# Patient Record
Sex: FEMALE | Race: WHITE | NOT HISPANIC OR LATINO | URBAN - METROPOLITAN AREA
[De-identification: names, ages, dates, MRNs, and addresses within clinical notes are randomized per-mention and may not be internally consistent; named-entity substitution may affect disease eponyms.]

---

## 2017-02-06 ENCOUNTER — OUTPATIENT (OUTPATIENT)
Dept: OUTPATIENT SERVICES | Facility: HOSPITAL | Age: 67
LOS: 1 days | End: 2017-02-06
Payer: MEDICARE

## 2017-02-06 ENCOUNTER — APPOINTMENT (OUTPATIENT)
Dept: MRI IMAGING | Facility: CLINIC | Age: 67
End: 2017-02-06

## 2017-02-06 DIAGNOSIS — Z00.8 ENCOUNTER FOR OTHER GENERAL EXAMINATION: ICD-10-CM

## 2017-02-06 PROCEDURE — A9585: CPT

## 2017-02-06 PROCEDURE — C8908: CPT

## 2017-02-06 PROCEDURE — 82565 ASSAY OF CREATININE: CPT

## 2017-02-06 PROCEDURE — C8937: CPT

## 2017-06-02 ENCOUNTER — APPOINTMENT (OUTPATIENT)
Dept: ENDOCRINOLOGY | Facility: CLINIC | Age: 67
End: 2017-06-02

## 2017-06-02 VITALS
BODY MASS INDEX: 22.68 KG/M2 | DIASTOLIC BLOOD PRESSURE: 74 MMHG | HEART RATE: 59 BPM | HEIGHT: 63 IN | WEIGHT: 128 LBS | OXYGEN SATURATION: 98 % | SYSTOLIC BLOOD PRESSURE: 114 MMHG

## 2017-06-02 RX ORDER — DENOSUMAB 60 MG/ML
60 INJECTION SUBCUTANEOUS
Qty: 1 | Refills: 0 | Status: COMPLETED | OUTPATIENT
Start: 2017-06-02

## 2017-06-02 RX ADMIN — DENOSUMAB 0 MG/ML: 60 INJECTION SUBCUTANEOUS at 00:00

## 2017-11-20 ENCOUNTER — APPOINTMENT (OUTPATIENT)
Dept: SURGERY | Facility: CLINIC | Age: 67
End: 2017-11-20
Payer: MEDICARE

## 2017-11-20 PROCEDURE — 99213K: CUSTOM

## 2017-12-08 ENCOUNTER — APPOINTMENT (OUTPATIENT)
Dept: ENDOCRINOLOGY | Facility: CLINIC | Age: 67
End: 2017-12-08
Payer: MEDICARE

## 2017-12-08 VITALS — OXYGEN SATURATION: 98 % | HEART RATE: 74 BPM | SYSTOLIC BLOOD PRESSURE: 120 MMHG | DIASTOLIC BLOOD PRESSURE: 80 MMHG

## 2017-12-08 PROCEDURE — 99214 OFFICE O/P EST MOD 30 MIN: CPT | Mod: 25

## 2017-12-08 PROCEDURE — 96372 THER/PROPH/DIAG INJ SC/IM: CPT

## 2017-12-08 PROCEDURE — 77080 DXA BONE DENSITY AXIAL: CPT | Mod: GA

## 2017-12-08 PROCEDURE — ZZZZZ: CPT

## 2017-12-08 RX ORDER — DENOSUMAB 60 MG/ML
60 INJECTION SUBCUTANEOUS
Qty: 0 | Refills: 0 | Status: COMPLETED | OUTPATIENT
Start: 2017-12-08

## 2017-12-08 RX ADMIN — DENOSUMAB 0 MG/ML: 60 INJECTION SUBCUTANEOUS at 00:00

## 2017-12-09 LAB
CHOLEST SERPL-MCNC: 247 MG/DL
CHOLEST/HDLC SERPL: 2.7 RATIO
HDLC SERPL-MCNC: 93 MG/DL
LDLC SERPL CALC-MCNC: 138 MG/DL
TRIGL SERPL-MCNC: 82 MG/DL

## 2018-02-27 ENCOUNTER — APPOINTMENT (OUTPATIENT)
Dept: MRI IMAGING | Facility: CLINIC | Age: 68
End: 2018-02-27
Payer: MEDICARE

## 2018-02-27 ENCOUNTER — OUTPATIENT (OUTPATIENT)
Dept: OUTPATIENT SERVICES | Facility: HOSPITAL | Age: 68
LOS: 1 days | End: 2018-02-27
Payer: MEDICARE

## 2018-02-27 DIAGNOSIS — Z00.8 ENCOUNTER FOR OTHER GENERAL EXAMINATION: ICD-10-CM

## 2018-02-27 PROCEDURE — A9585: CPT

## 2018-02-27 PROCEDURE — 0159T: CPT | Mod: 26

## 2018-02-27 PROCEDURE — C8937: CPT

## 2018-02-27 PROCEDURE — C8908: CPT

## 2018-02-27 PROCEDURE — 82565 ASSAY OF CREATININE: CPT

## 2018-02-27 PROCEDURE — 77059 MRI BREAST BILATERAL: CPT | Mod: 26

## 2018-06-15 ENCOUNTER — APPOINTMENT (OUTPATIENT)
Dept: ENDOCRINOLOGY | Facility: CLINIC | Age: 68
End: 2018-06-15
Payer: MEDICARE

## 2018-06-15 ENCOUNTER — TRANSCRIPTION ENCOUNTER (OUTPATIENT)
Age: 68
End: 2018-06-15

## 2018-06-15 VITALS
HEIGHT: 63.5 IN | BODY MASS INDEX: 21.35 KG/M2 | SYSTOLIC BLOOD PRESSURE: 130 MMHG | WEIGHT: 122 LBS | HEART RATE: 73 BPM | DIASTOLIC BLOOD PRESSURE: 80 MMHG | OXYGEN SATURATION: 93 %

## 2018-06-15 DIAGNOSIS — Z00.00 ENCOUNTER FOR GENERAL ADULT MEDICAL EXAMINATION W/OUT ABNORMAL FINDINGS: ICD-10-CM

## 2018-06-15 PROCEDURE — 96372 THER/PROPH/DIAG INJ SC/IM: CPT

## 2018-06-15 PROCEDURE — 99214 OFFICE O/P EST MOD 30 MIN: CPT | Mod: 25

## 2018-06-15 RX ORDER — MECLIZINE HYDROCHLORIDE 25 MG/1
25 TABLET ORAL
Qty: 20 | Refills: 0 | Status: COMPLETED | COMMUNITY
Start: 2018-05-18

## 2018-06-15 RX ORDER — METRONIDAZOLE 500 MG/1
500 TABLET ORAL
Qty: 30 | Refills: 0 | Status: COMPLETED | COMMUNITY
Start: 2018-03-21

## 2018-06-15 RX ORDER — CLOTRIMAZOLE 10 MG/1
10 LOZENGE ORAL
Qty: 21 | Refills: 0 | Status: COMPLETED | COMMUNITY
Start: 2018-04-02

## 2018-06-15 RX ORDER — DICYCLOMINE HYDROCHLORIDE 10 MG/1
10 CAPSULE ORAL
Qty: 30 | Refills: 0 | Status: COMPLETED | COMMUNITY
Start: 2018-04-05

## 2018-06-15 RX ORDER — CIPROFLOXACIN HYDROCHLORIDE 500 MG/1
500 TABLET, FILM COATED ORAL
Qty: 20 | Refills: 0 | Status: COMPLETED | COMMUNITY
Start: 2018-03-21

## 2018-06-15 RX ORDER — DENOSUMAB 60 MG/ML
60 INJECTION SUBCUTANEOUS
Qty: 0 | Refills: 0 | Status: COMPLETED | OUTPATIENT
Start: 2018-06-15

## 2018-06-15 RX ORDER — FLUCONAZOLE 150 MG/1
150 TABLET ORAL
Qty: 7 | Refills: 0 | Status: COMPLETED | COMMUNITY
Start: 2018-04-06

## 2018-06-15 RX ADMIN — DENOSUMAB 0 MG/ML: 60 INJECTION SUBCUTANEOUS at 00:00

## 2018-11-26 ENCOUNTER — APPOINTMENT (OUTPATIENT)
Dept: SURGERY | Facility: CLINIC | Age: 68
End: 2018-11-26
Payer: MEDICARE

## 2018-11-26 PROCEDURE — 99213K: CUSTOM

## 2018-12-21 ENCOUNTER — APPOINTMENT (OUTPATIENT)
Dept: ENDOCRINOLOGY | Facility: CLINIC | Age: 68
End: 2018-12-21
Payer: MEDICARE

## 2018-12-21 VITALS
DIASTOLIC BLOOD PRESSURE: 90 MMHG | HEART RATE: 74 BPM | SYSTOLIC BLOOD PRESSURE: 120 MMHG | OXYGEN SATURATION: 98 % | HEIGHT: 63.5 IN | WEIGHT: 126 LBS | BODY MASS INDEX: 22.05 KG/M2

## 2018-12-21 PROCEDURE — 96372 THER/PROPH/DIAG INJ SC/IM: CPT

## 2018-12-21 PROCEDURE — 99214 OFFICE O/P EST MOD 30 MIN: CPT | Mod: 25

## 2018-12-21 RX ORDER — DENOSUMAB 60 MG/ML
60 INJECTION SUBCUTANEOUS
Qty: 1 | Refills: 0 | Status: COMPLETED | OUTPATIENT
Start: 2018-12-21

## 2018-12-21 RX ORDER — DENOSUMAB 60 MG/ML
60 INJECTION SUBCUTANEOUS
Refills: 0 | Status: ACTIVE | COMMUNITY

## 2018-12-21 RX ADMIN — DENOSUMAB 0 MG/ML: 60 INJECTION SUBCUTANEOUS at 00:00

## 2019-02-07 ENCOUNTER — OUTPATIENT (OUTPATIENT)
Dept: OUTPATIENT SERVICES | Facility: HOSPITAL | Age: 69
LOS: 1 days | End: 2019-02-07
Payer: MEDICARE

## 2019-02-07 ENCOUNTER — APPOINTMENT (OUTPATIENT)
Dept: MRI IMAGING | Facility: CLINIC | Age: 69
End: 2019-02-07
Payer: MEDICARE

## 2019-02-07 DIAGNOSIS — Z00.8 ENCOUNTER FOR OTHER GENERAL EXAMINATION: ICD-10-CM

## 2019-02-07 PROCEDURE — C8908: CPT

## 2019-02-07 PROCEDURE — 77049 MRI BREAST C-+ W/CAD BI: CPT | Mod: 26

## 2019-02-07 PROCEDURE — C8937: CPT

## 2019-02-07 PROCEDURE — A9585: CPT

## 2019-07-05 ENCOUNTER — APPOINTMENT (OUTPATIENT)
Dept: ENDOCRINOLOGY | Facility: CLINIC | Age: 69
End: 2019-07-05
Payer: MEDICARE

## 2019-07-05 VITALS
DIASTOLIC BLOOD PRESSURE: 80 MMHG | OXYGEN SATURATION: 98 % | SYSTOLIC BLOOD PRESSURE: 120 MMHG | BODY MASS INDEX: 21.35 KG/M2 | HEIGHT: 63.5 IN | HEART RATE: 64 BPM | WEIGHT: 122 LBS

## 2019-07-05 PROCEDURE — 99214 OFFICE O/P EST MOD 30 MIN: CPT

## 2019-07-05 NOTE — ASSESSMENT
[Denosumab Therapy] : Risks  and benefits of denosumab therapy were discussed with the patient including eczema, cellulitis, osteonecrosis of the jaw and atypical femur fractures [FreeTextEntry1] : 69-year-old female with\par 1.  osteoporosis. Bone density Nov 2016  showed  progressive decrease in spine and hip. Was on Evista after long-term Fosamax. Changed to Prolia tolerating well but had tooth extraction, "dry socket". \par Repeat bone density significantly improved in spine.\par Delay Prolia for 4 weeks. \par \par 2. clinically and chemically euthyroid after stopping low dose LT4 for suppression of small thyroid nodules; lack of data for this reviewed. thyroid nodules remain small and stable.  TSH 2.91 Observe

## 2019-07-05 NOTE — HISTORY OF PRESENT ILLNESS
[Calcium (dietary)] : calcium from their regular diet [Raloxifene (Evista)] : Raloxifene [Prolia (Denosumab)] : Prolia (Denosumab) [Family History of Breast Cancer] : family history of breast cancer [Family History of Osteoporosis] : family history of osteoporosis [Regular Dental Follow-Up] : regular dental follow-up [FreeTextEntry1] : f/u 69 -year-old female,  osteoporosis first diagnosed with low bone density at age 51. Patient took Fosamax for 8 to 9 years. stopped about 6 years. did not tolerate Actonel in past due to UGI sx's. Switched to Evista few years ago, and tolerated well. Had one dose of  Prolia from Dr. Kaycee benson years ago. \par BMD Nov 2016 decreased Restarted Prolia Tolerated well no fx. \par Had recent wisdom tooth extraction: "dry socket":\par no fracture\par BMD 12/17 improved but still low.\par Ca: 9.5\par \par stopped Evista  despite family history of breast cancer in mother.\par  Had ? swelling in neck, sono normal \par history multinodular goiter. was on low-dose Synthroid 25 mcg daily for suppression of nodules. stopped LT4 2, no change in sx's. Patient unaware of hypothyroidism.TSH 2.17  off rx. No change in neck or sx's.\par \par Recent episodes of diverticulitis, thrush, and vertigo.  [Amenorrhea] : no past or present history of amenorrhea [Disordered Eating] : no past or present history of disordered eating [Taking Steroids] : no past or present history of taking steroids [Kidney Stones] : no history of kidney stones [Family History of Hip Fracture] : no family history of hip fracture [Hyperparathyroidism] : no hyperparathyroidism [History of Radiation Therapy] : no history of radiation therapy [History of Blood Clots] : no history of blood clots [de-identified] : mother breast cancer

## 2019-07-05 NOTE — RESULTS/DATA
[FreeTextEntry1] : bone density June 26, 2013\par Spine L1-L3 -2.9\par Total hip -2.9\par Femoral neck -2.7\par No statistical difference versus 2011

## 2019-07-05 NOTE — PHYSICAL EXAM
[Alert] : alert [No Acute Distress] : no acute distress [Well Nourished] : well nourished [Well Developed] : well developed [Normal Sclera/Conjunctiva] : normal sclera/conjunctiva [No Proptosis] : no proptosis [Normal Oropharynx] : the oropharynx was normal [Thyroid Not Enlarged] : the thyroid was not enlarged [No Thyroid Nodules] : there were no palpable thyroid nodules [No Respiratory Distress] : no respiratory distress [No Accessory Muscle Use] : no accessory muscle use [Clear to Auscultation] : lungs were clear to auscultation bilaterally [Normal Rate] : heart rate was normal  [Normal S1, S2] : normal S1 and S2 [Regular Rhythm] : with a regular rhythm [No Edema] : there was no peripheral edema [Normal Bowel Sounds] : normal bowel sounds [Not Tender] : non-tender [Soft] : abdomen soft [Not Distended] : not distended [Post Cervical Nodes] : posterior cervical nodes [Anterior Cervical Nodes] : anterior cervical nodes [Normal] : normal and non tender [No Spinal Tenderness] : no spinal tenderness [Spine Straight] : spine straight [No Stigmata of Cushings Syndrome] : no stigmata of cushings syndrome [Normal Gait] : normal gait [Normal Strength/Tone] : muscle strength and tone were normal [No Rash] : no rash [No Skin Lesions] : no skin lesions [Normal Reflexes] : deep tendon reflexes were 2+ and symmetric [No Tremors] : no tremors [Oriented x3] : oriented to person, place, and time [de-identified] : right lower molar extraction site: no infection [de-identified] : normal range of motion LE's bilat, normal strength, no localizing hip disease

## 2019-07-05 NOTE — PROCEDURE
[FreeTextEntry1] : Thyroid US - 07/5/19\par Right: Cystic nodule 7 x 6 x 10 mm\par Left:  nodules 8 x 6 x10\par 2nd nodule not identified\par \par Thyroid US - 06/15/2018 \par Right: Cystic nodule 7 x 6 x 8 mm\par Left: two nodules 5 x 7 x 9\par left  6 x 7 x 8 mm nodule\par \par Thyroid US - 06/15/2018 \par Right: Cystic nodule 7 x 6 x 8 mm\par Left: Possibly two nodules 8 x 5 x 10 mm, adjacent 6 mm nodule\par \par Bone mineral density 12/8/17\par indication: assess response to medication \par spine -2.5 excluding L4 osteoporosis +8.7%\par total hip -2.8, osteoporosis +4.2%\par femoral neck -3.4 osteoporosis, no significant change \par proximal radius  -3.0 osteoporosis, no significant change \par \par Thyroid ultrasound  December 8, 2017\par right 8 mm mostly cystic nodule\par Left 6 x 4 x 11 mm nodule\par \par Bone mineral density November 29, 2016\par Comparison to 2015\par Spine L1-L3 -3.0, osteoporosis, no significant change Versus 2015 but -4.1% versus 2014\par Total hip -3.0, osteoporosis, -5.3% \par Femoral neck -3.2, osteoporosis, +4.2% \par Proximal radius -2.8, osteoporosis, no significant \par \par Thyroid ultrasound November 29, 2016 \par Right 4 mm simple cyst \par Left 6 4  x 10 mm cystic nodule \par Second left partially cystic nodule 6 x 5  x 10 mm Nodule\par \par \par bone mineral density November 24, 2015\par Indication prior test showed bone loss\par Spine -2.9, osteoporosis, no significant change versus 2014 or 2013\par Total hip -2.7, osteoporosis, no significant change\par femoral neck -3.4, osteoporosis, no significant change\par  proximal radius -2.9, osteoporosis, no significant change\par \par Thyroid ultrasound November 24, 2015\par 2 small left nodules\par Largest 8 mm sagittal smaller 4 mm sagittal\par \par \par bone mineral density test performed November 19, 2014\par spine -2.7,, osteoporosis, no significant change versus 2013\par total hip -2.7, osteoporosis, no significant change versus outside study of 2013\par Femoral neck -3.3, osteoporosis, appears decreased versus 2013 but this appears to be due to changing equipment then changing region of interest\par Proximal radius -2.6, osteoporosis, no prior study

## 2019-08-16 ENCOUNTER — APPOINTMENT (OUTPATIENT)
Dept: ENDOCRINOLOGY | Facility: CLINIC | Age: 69
End: 2019-08-16
Payer: MEDICARE

## 2019-08-16 VITALS
SYSTOLIC BLOOD PRESSURE: 138 MMHG | DIASTOLIC BLOOD PRESSURE: 88 MMHG | OXYGEN SATURATION: 98 % | HEART RATE: 68 BPM | RESPIRATION RATE: 18 BRPM

## 2019-08-16 PROCEDURE — 96372 THER/PROPH/DIAG INJ SC/IM: CPT

## 2019-08-16 PROCEDURE — 99212 OFFICE O/P EST SF 10 MIN: CPT | Mod: 25

## 2019-08-16 RX ORDER — DENOSUMAB 60 MG/ML
60 INJECTION SUBCUTANEOUS
Qty: 1 | Refills: 0 | Status: COMPLETED | OUTPATIENT
Start: 2019-08-16

## 2019-08-16 RX ADMIN — DENOSUMAB 0 MG/ML: 60 INJECTION SUBCUTANEOUS at 00:00

## 2019-11-18 ENCOUNTER — APPOINTMENT (OUTPATIENT)
Dept: SURGERY | Facility: CLINIC | Age: 69
End: 2019-11-18
Payer: MEDICARE

## 2019-11-18 PROCEDURE — 99213K: CUSTOM

## 2020-02-13 ENCOUNTER — APPOINTMENT (OUTPATIENT)
Dept: MRI IMAGING | Facility: CLINIC | Age: 70
End: 2020-02-13
Payer: MEDICARE

## 2020-02-13 ENCOUNTER — OUTPATIENT (OUTPATIENT)
Dept: OUTPATIENT SERVICES | Facility: HOSPITAL | Age: 70
LOS: 1 days | End: 2020-02-13
Payer: MEDICARE

## 2020-02-13 DIAGNOSIS — Z00.8 ENCOUNTER FOR OTHER GENERAL EXAMINATION: ICD-10-CM

## 2020-02-13 PROCEDURE — C8908: CPT

## 2020-02-13 PROCEDURE — C8937: CPT

## 2020-02-13 PROCEDURE — A9585: CPT

## 2020-02-13 PROCEDURE — 77049 MRI BREAST C-+ W/CAD BI: CPT | Mod: 26

## 2020-02-20 ENCOUNTER — APPOINTMENT (OUTPATIENT)
Dept: ENDOCRINOLOGY | Facility: CLINIC | Age: 70
End: 2020-02-20
Payer: MEDICARE

## 2020-02-20 VITALS — BODY MASS INDEX: 21.35 KG/M2 | HEIGHT: 63.5 IN | WEIGHT: 122 LBS

## 2020-02-20 VITALS
BODY MASS INDEX: 21.35 KG/M2 | DIASTOLIC BLOOD PRESSURE: 80 MMHG | SYSTOLIC BLOOD PRESSURE: 120 MMHG | WEIGHT: 122 LBS | OXYGEN SATURATION: 95 % | HEIGHT: 63.5 IN | HEART RATE: 82 BPM

## 2020-02-20 PROCEDURE — 99213 OFFICE O/P EST LOW 20 MIN: CPT | Mod: 25

## 2020-02-20 PROCEDURE — ZZZZZ: CPT

## 2020-02-20 PROCEDURE — 77080 DXA BONE DENSITY AXIAL: CPT | Mod: GA

## 2020-02-20 PROCEDURE — 96372 THER/PROPH/DIAG INJ SC/IM: CPT

## 2020-02-20 RX ORDER — CHROMIUM 200 MCG
TABLET ORAL
Refills: 0 | Status: ACTIVE | COMMUNITY

## 2020-02-20 RX ORDER — DENOSUMAB 60 MG/ML
60 INJECTION SUBCUTANEOUS
Qty: 1 | Refills: 0 | Status: COMPLETED | OUTPATIENT
Start: 2020-02-20

## 2020-02-20 RX ADMIN — DENOSUMAB 60 MG/ML: 60 INJECTION SUBCUTANEOUS at 00:00

## 2020-02-21 NOTE — HISTORY OF PRESENT ILLNESS
[Calcium (dietary)] : calcium from their regular diet [Prolia (Denosumab)] : Prolia (Denosumab) [Raloxifene (Evista)] : Raloxifene [Family History of Osteoporosis] : family history of osteoporosis [Family History of Breast Cancer] : family history of breast cancer [Regular Dental Follow-Up] : regular dental follow-up [FreeTextEntry1] : f/u 69 -year-old female w/ osteoporosis \par \par First diagnosed with low bone density at age 51. Patient took Fosamax for 8 to 9 years. stopped about 6 years. Did not tolerate Actonel in past due to UGI sx's. Switched to Evista few years ago, and tolerated well now d/c. Had one dose of Prolia from Dr. Benoit several years ago. BMD Nov 2016 decreased. Transitioned to Prolia 11/2016. Tolerating well. No thigh pain, no interval fx. Last DDS within past 6 months. No ONJ. Had prior wisdom tooth extraction. BMD 12/2017 improved but still low.\par \par History multinodular goiter. was on low-dose Synthroid 25 mcg daily for suppression of nodules. Stopped LT4 25, no change in sx's. Patient unaware of hypothyroidism. No change in neck or sx's. No local neck pain. No dysphagia or dysphonia. No raciness, shakiness, heat/cold intolerance, tiredness, or fatigue. No palpitations, tremors, or sudden weight gain/loss.\par \par Prior episodes of diverticulitis, thrush, and vertigo.\par \par Pt broke nose en route to shingles shot in the pharmacy. [Disordered Eating] : no past or present history of disordered eating [Amenorrhea] : no past or present history of amenorrhea [Taking Steroids] : no past or present history of taking steroids [Kidney Stones] : no history of kidney stones [Family History of Hip Fracture] : no family history of hip fracture [Hyperparathyroidism] : no hyperparathyroidism [History of Radiation Therapy] : no history of radiation therapy [History of Blood Clots] : no history of blood clots [de-identified] : mother breast cancer

## 2020-02-21 NOTE — PROCEDURE
[FreeTextEntry1] : Thyroid US - 02/20/2020\par Left: solid nodule 6 x 6 x 9 mm\par Right: cyst 7 x 6 x 9 mm\par \par Bone mineral density: 02/20/2020 \par Indication: vs. 2017\par Spine: (L1-L3) -2.1 osteopenia, +5.0%, approx. +14% since 2016\par Total hip: -2.7 osteoporosis, no significant change, approx +6% vs. 2016\par Femoral neck: -3.0 osteoporosis, +8.0%\par Proximal radius: -2.8 osteoporosis, no significant change\par \par Thyroid US - 07/5/19\par Right: Cystic nodule 7 x 6 x 10 mm\par Left:  nodules 8 x 6 x10\par 2nd nodule not identified\par \par Thyroid US - 06/15/2018 \par Right: Cystic nodule 7 x 6 x 8 mm\par Left: two nodules 5 x 7 x 9\par left  6 x 7 x 8 mm nodule\par \par Thyroid US - 06/15/2018 \par Right: Cystic nodule 7 x 6 x 8 mm\par Left: Possibly two nodules 8 x 5 x 10 mm, adjacent 6 mm nodule\par \par Bone mineral density 12/8/17\par indication: assess response to medication \par spine -2.5 excluding L4 osteoporosis +8.7%\par total hip -2.8, osteoporosis +4.2%\par femoral neck -3.4 osteoporosis, no significant change \par proximal radius  -3.0 osteoporosis, no significant change \par \par Thyroid ultrasound  December 8, 2017\par right 8 mm mostly cystic nodule\par Left 6 x 4 x 11 mm nodule\par \par Bone mineral density November 29, 2016\par Comparison to 2015\par Spine L1-L3 -3.0, osteoporosis, no significant change Versus 2015 but -4.1% versus 2014\par Total hip -3.0, osteoporosis, -5.3% \par Femoral neck -3.2, osteoporosis, +4.2% \par Proximal radius -2.8, osteoporosis, no significant \par \par Thyroid ultrasound November 29, 2016 \par Right 4 mm simple cyst \par Left 6 4  x 10 mm cystic nodule \par Second left partially cystic nodule 6 x 5  x 10 mm Nodule\par \par \par bone mineral density November 24, 2015\par Indication prior test showed bone loss\par Spine -2.9, osteoporosis, no significant change versus 2014 or 2013\par Total hip -2.7, osteoporosis, no significant change\par femoral neck -3.4, osteoporosis, no significant change\par  proximal radius -2.9, osteoporosis, no significant change\par \par Thyroid ultrasound November 24, 2015\par 2 small left nodules\par Largest 8 mm sagittal smaller 4 mm sagittal\par \par \par bone mineral density test performed November 19, 2014\par spine -2.7,, osteoporosis, no significant change versus 2013\par total hip -2.7, osteoporosis, no significant change versus outside study of 2013\par Femoral neck -3.3, osteoporosis, appears decreased versus 2013 but this appears to be due to changing equipment then changing region of interest\par Proximal radius -2.6, osteoporosis, no prior study

## 2020-02-21 NOTE — PHYSICAL EXAM
[No Acute Distress] : no acute distress [Alert] : alert [Well Nourished] : well nourished [No Proptosis] : no proptosis [Normal Oropharynx] : the oropharynx was normal [Well Developed] : well developed [Normal Sclera/Conjunctiva] : normal sclera/conjunctiva [No Thyroid Nodules] : there were no palpable thyroid nodules [Thyroid Not Enlarged] : the thyroid was not enlarged [No Accessory Muscle Use] : no accessory muscle use [No Respiratory Distress] : no respiratory distress [Clear to Auscultation] : lungs were clear to auscultation bilaterally [Normal S1, S2] : normal S1 and S2 [Normal Rate] : heart rate was normal  [Normal Bowel Sounds] : normal bowel sounds [Regular Rhythm] : with a regular rhythm [No Edema] : there was no peripheral edema [Not Tender] : non-tender [Soft] : abdomen soft [Not Distended] : not distended [Anterior Cervical Nodes] : anterior cervical nodes [Normal] : normal and non tender [Post Cervical Nodes] : posterior cervical nodes [No Stigmata of Cushings Syndrome] : no stigmata of cushings syndrome [No Spinal Tenderness] : no spinal tenderness [Spine Straight] : spine straight [Normal Gait] : normal gait [Normal Strength/Tone] : muscle strength and tone were normal [No Rash] : no rash [No Skin Lesions] : no skin lesions [No Tremors] : no tremors [Normal Reflexes] : deep tendon reflexes were 2+ and symmetric [Oriented x3] : oriented to person, place, and time [de-identified] : right lower molar extraction site: no infection [de-identified] : normal range of motion LE's bilat, normal strength, no localizing hip disease

## 2020-02-21 NOTE — ASSESSMENT
[Denosumab Therapy] : Risks  and benefits of denosumab therapy were discussed with the patient including eczema, cellulitis, osteonecrosis of the jaw and atypical femur fractures [FreeTextEntry1] : 69 year-old female with\par \par 1. Osteoporosis. BMD 2016 showed progressive decrease in spine and hip. Was on Evista after long-term Fosamax. Transitioned to Prolia 11/2016. Tolerating well. No thigh pain, no interval fx. No ONJ. BMD 12/2017 significantly improved in spine. BMD 2/2020 indicates improving osteopenia in spine, stable osteoporosis in total hip and proximal radius, and improving osteoporosis in femoral neck. BMD results reviewed w/ pt. Continue Prolia, buy and bill.\par \par 2. Clinically and chemically euthyroid after stopping low dose LT4 for suppression of small thyroid nodules; lack of data for this reviewed. No local neck pain. No dysphagia or dysphonia. No raciness, shakiness, heat/cold intolerance, tiredness, or fatigue. No palpitations, tremors, or sudden weight gain/loss. TUS 2/2020 indicates stable bilateral nodules. Observe.\par \par Labs reviewed: Ca 9.9, normal. TSH 2.71, normal.\par \par f/u in 6 months

## 2020-02-21 NOTE — END OF VISIT
[FreeTextEntry3] : I, Paulo Alex, authored this note working as a medical scribe for Dr. Gaston.  02/20/2020.  2:30PM. This note was authored by the medical scribe for me. I have reviewed, edited, and revised the note as needed. I am in agreement with the exam findings, imaging findings, and treatment plan.  Prince Gaston MD

## 2020-08-31 ENCOUNTER — TRANSCRIPTION ENCOUNTER (OUTPATIENT)
Age: 70
End: 2020-08-31

## 2020-08-31 ENCOUNTER — APPOINTMENT (OUTPATIENT)
Dept: ENDOCRINOLOGY | Facility: CLINIC | Age: 70
End: 2020-08-31
Payer: MEDICARE

## 2020-08-31 VITALS
TEMPERATURE: 97.9 F | SYSTOLIC BLOOD PRESSURE: 124 MMHG | HEART RATE: 74 BPM | HEIGHT: 63.5 IN | DIASTOLIC BLOOD PRESSURE: 84 MMHG | BODY MASS INDEX: 20.65 KG/M2 | OXYGEN SATURATION: 98 % | WEIGHT: 118 LBS

## 2020-08-31 PROCEDURE — 96372 THER/PROPH/DIAG INJ SC/IM: CPT

## 2020-08-31 PROCEDURE — 99213 OFFICE O/P EST LOW 20 MIN: CPT | Mod: 25

## 2020-08-31 RX ORDER — BIOTIN 10 MG
TABLET ORAL
Refills: 0 | Status: DISCONTINUED | COMMUNITY
End: 2020-08-31

## 2020-08-31 RX ORDER — ATORVASTATIN CALCIUM 10 MG/1
10 TABLET, FILM COATED ORAL
Refills: 0 | Status: ACTIVE | COMMUNITY

## 2020-08-31 RX ORDER — DENOSUMAB 60 MG/ML
60 INJECTION SUBCUTANEOUS
Qty: 1 | Refills: 0 | Status: COMPLETED | OUTPATIENT
Start: 2020-08-31

## 2020-08-31 RX ADMIN — DENOSUMAB 60 MG/ML: 60 INJECTION SUBCUTANEOUS at 00:00

## 2020-09-01 NOTE — HISTORY OF PRESENT ILLNESS
[Raloxifene (Evista)] : Raloxifene [Calcium (dietary)] : calcium from their regular diet [Prolia (Denosumab)] : Prolia (Denosumab) [Family History of Osteoporosis] : family history of osteoporosis [Family History of Breast Cancer] : family history of breast cancer [Regular Dental Follow-Up] : regular dental follow-up [FreeTextEntry1] : \par First diagnosed with low bone density at age 51. Patient took Fosamax for 8 to 9 years. stopped about 6 years. Did not tolerate Actonel in past due to UGI sx's. Switched to Evista few years ago, and tolerated well now d/c. Had one dose of Prolia from Dr. Benoit several years ago. BMD Nov 2016 decreased. Transitioned to Prolia 11/2016. Tolerating well. No thigh pain, no interval fx. Last DDS within past 6 months. No ONJ. Had wisdom tooth extraction, left upper, healed well.. BMD 12/2017 improved but still low.\par \par History multinodular goiter. was on low-dose Synthroid 25 mcg daily for suppression of nodules. Stopped LT4 25, no change in sx's. Patient unaware of hypothyroidism. No change in neck or sx's. No local neck pain. No dysphagia or dysphonia. No raciness, shakiness, heat/cold intolerance, tiredness, or fatigue. No palpitations, tremors, or sudden weight gain/loss.\par \par Prior episodes of diverticulitis, thrush, and vertigo.\par Sister passed away 2 weeks ago . Very stressed. Has old chronic cough. \par added Atorvastatin for  . Had ETT negative \par  [Amenorrhea] : no past or present history of amenorrhea [Disordered Eating] : no past or present history of disordered eating [Taking Steroids] : no past or present history of taking steroids [Kidney Stones] : no history of kidney stones [Family History of Hip Fracture] : no family history of hip fracture [Hyperparathyroidism] : no hyperparathyroidism [History of Radiation Therapy] : no history of radiation therapy [History of Blood Clots] : no history of blood clots [de-identified] : mother breast cancer

## 2020-09-01 NOTE — ASSESSMENT
[Denosumab Therapy] : Risks  and benefits of denosumab therapy were discussed with the patient including eczema, cellulitis, osteonecrosis of the jaw and atypical femur fractures [FreeTextEntry1] : 70 year-old female with\par \par 1. Osteoporosis. BMD 2016 showed progressive decrease in spine and hip. Was on Evista after long-term Fosamax. Transitioned to Prolia 11/2016. Tolerating well. No thigh pain, no interval fx. No ONJ. BMD 12/2017 significantly improved in spine. BMD 2/2020 indicates improving osteopenia in spine, stable osteoporosis in total hip and proximal radius, and improving osteoporosis in femoral neck. BMD results reviewed w/ pt. Continue Prolia, buy and bill.\par \par 2. Clinically and chemically euthyroid after stopping low dose LT4 for suppression of small thyroid nodules; lack of data for this reviewed. No local neck pain. No dysphagia or dysphonia. No raciness, shakiness, heat/cold intolerance, tiredness, or fatigue. No palpitations, tremors, or sudden weight gain/loss. TUS   indicates stable bilateral nodules. Observe.\par \par Labs reviewed: Ca 10.1 c normal. TSH 3.47 , normal.\par \par f/u in 6 months

## 2020-11-09 ENCOUNTER — APPOINTMENT (OUTPATIENT)
Dept: SURGERY | Facility: CLINIC | Age: 70
End: 2020-11-09
Payer: MEDICARE

## 2020-11-09 PROCEDURE — 99213K: CUSTOM

## 2020-12-23 ENCOUNTER — TRANSCRIPTION ENCOUNTER (OUTPATIENT)
Age: 70
End: 2020-12-23

## 2021-02-16 ENCOUNTER — APPOINTMENT (OUTPATIENT)
Dept: MRI IMAGING | Facility: CLINIC | Age: 71
End: 2021-02-16
Payer: MEDICARE

## 2021-02-16 ENCOUNTER — RESULT REVIEW (OUTPATIENT)
Age: 71
End: 2021-02-16

## 2021-02-16 ENCOUNTER — OUTPATIENT (OUTPATIENT)
Dept: OUTPATIENT SERVICES | Facility: HOSPITAL | Age: 71
LOS: 1 days | End: 2021-02-16
Payer: MEDICARE

## 2021-02-16 DIAGNOSIS — Z00.8 ENCOUNTER FOR OTHER GENERAL EXAMINATION: ICD-10-CM

## 2021-02-16 PROCEDURE — 77049 MRI BREAST C-+ W/CAD BI: CPT | Mod: 26,MH

## 2021-02-16 PROCEDURE — A9585: CPT

## 2021-02-16 PROCEDURE — C8908: CPT

## 2021-02-16 PROCEDURE — C8937: CPT

## 2021-03-02 ENCOUNTER — NON-APPOINTMENT (OUTPATIENT)
Age: 71
End: 2021-03-02

## 2021-03-08 ENCOUNTER — MED ADMIN CHARGE (OUTPATIENT)
Age: 71
End: 2021-03-08

## 2021-03-08 ENCOUNTER — APPOINTMENT (OUTPATIENT)
Dept: ENDOCRINOLOGY | Facility: CLINIC | Age: 71
End: 2021-03-08
Payer: MEDICARE

## 2021-03-08 VITALS
WEIGHT: 124 LBS | BODY MASS INDEX: 21.7 KG/M2 | DIASTOLIC BLOOD PRESSURE: 80 MMHG | HEART RATE: 69 BPM | OXYGEN SATURATION: 95 % | HEIGHT: 63.5 IN | SYSTOLIC BLOOD PRESSURE: 124 MMHG | TEMPERATURE: 97.2 F

## 2021-03-08 PROCEDURE — 99214 OFFICE O/P EST MOD 30 MIN: CPT | Mod: 25

## 2021-03-08 PROCEDURE — 96372 THER/PROPH/DIAG INJ SC/IM: CPT

## 2021-03-08 RX ORDER — ALPRAZOLAM 0.25 MG/1
0.25 TABLET ORAL
Qty: 30 | Refills: 0 | Status: ACTIVE | COMMUNITY
Start: 2021-02-18

## 2021-03-08 RX ORDER — TOBRAMYCIN AND DEXAMETHASONE 3; 1 MG/ML; MG/ML
0.3-0.1 SUSPENSION/ DROPS OPHTHALMIC
Qty: 5 | Refills: 0 | Status: COMPLETED | COMMUNITY
Start: 2020-09-09

## 2021-03-08 RX ORDER — DENOSUMAB 60 MG/ML
60 INJECTION SUBCUTANEOUS
Qty: 1 | Refills: 0 | Status: COMPLETED | OUTPATIENT
Start: 2021-03-08

## 2021-03-08 RX ORDER — TRAZODONE HYDROCHLORIDE 50 MG/1
50 TABLET ORAL
Qty: 30 | Refills: 0 | Status: COMPLETED | COMMUNITY
Start: 2020-10-10

## 2021-03-08 RX ADMIN — DENOSUMAB 0 MG/ML: 60 INJECTION SUBCUTANEOUS at 00:00

## 2021-03-09 NOTE — ASSESSMENT
[Denosumab Therapy] : Risks  and benefits of denosumab therapy were discussed with the patient including eczema, cellulitis, osteonecrosis of the jaw and atypical femur fractures [FreeTextEntry1] : 70 year-old female with\par \par 1. Osteoporosis. BMD 2016 showed progressive decrease in spine and hip. Was on Evista after long-term Fosamax. Transitioned to Prolia 11/2016. Tolerating well. No thigh pain, no interval fx. No ONJ. BMD 12/2017 significantly improved in spine. BMD 2/2020 indicates improving osteopenia in spine, stable osteoporosis in total hip and proximal radius, and improving osteoporosis in femoral neck. Continue Prolia, buy and bill.\par \par 2. Clinically and chemically euthyroid after stopping low dose LT4 for suppression of small thyroid nodules; lack of data for this reviewed. No local neck pain. No dysphagia or dysphonia. No raciness, shakiness, heat/cold intolerance, tiredness, or fatigue. No palpitations, tremors, or sudden weight gain/loss. TUS 2/2020 indicates stable bilateral nodules. TUS 3/2021 c/w stable trivial left nodule and trivial right cyst. Observe.\par \par Labs reviewed: Ca 9.5, normal. Vitamin D 44, normal. TSH 2.21, normal.\par \par F/u in 6 months

## 2021-03-09 NOTE — PROCEDURE
[FreeTextEntry1] : Thyroid US - 03/08/2021\par Left: upper hypoechoic nodule 7 mm x 5 mm x 8 mm\par Right: cyst 7 mm x 7 mm x 10 mm\par \par Thyroid US - 02/20/2020\par Left: solid nodule 8 x 6 x 9 mm\par Right: cyst 6 x 5 x 8 mm\par \par Bone mineral density: 02/20/2020 \par Indication: vs. 2017\par Spine: (L1-L3) -2.1 osteopenia, +5.0%, approx. +14% since 2016\par Total hip: -2.7 osteoporosis, no significant change, approx +6% vs. 2016\par Femoral neck: -3.0 osteoporosis, +8.0%\par Proximal radius: -2.8 osteoporosis, no significant change\par \par Thyroid US - 07/5/19\par Right: Cystic nodule 7 x 6 x 10 mm\par Left:  nodules 8 x 6 x10\par 2nd nodule not identified\par \par Thyroid US - 06/15/2018 \par Right: Cystic nodule 7 x 6 x 8 mm\par Left: two nodules 5 x 7 x 9\par left  6 x 7 x 8 mm nodule\par \par Thyroid US - 06/15/2018 \par Right: Cystic nodule 7 x 6 x 8 mm\par Left: Possibly two nodules 8 x 5 x 10 mm, adjacent 6 mm nodule\par \par Bone mineral density 12/8/17\par indication: assess response to medication \par spine -2.5 excluding L4 osteoporosis +8.7%\par total hip -2.8, osteoporosis +4.2%\par femoral neck -3.4 osteoporosis, no significant change \par proximal radius  -3.0 osteoporosis, no significant change \par \par Thyroid ultrasound  December 8, 2017\par right 8 mm mostly cystic nodule\par Left 6 x 4 x 11 mm nodule\par \par Bone mineral density November 29, 2016\par Comparison to 2015\par Spine L1-L3 -3.0, osteoporosis, no significant change Versus 2015 but -4.1% versus 2014\par Total hip -3.0, osteoporosis, -5.3% \par Femoral neck -3.2, osteoporosis, +4.2% \par Proximal radius -2.8, osteoporosis, no significant \par \par Thyroid ultrasound November 29, 2016 \par Right 4 mm simple cyst \par Left 6 4  x 10 mm cystic nodule \par Second left partially cystic nodule 6 x 5  x 10 mm Nodule\par \par \par bone mineral density November 24, 2015\par Indication prior test showed bone loss\par Spine -2.9, osteoporosis, no significant change versus 2014 or 2013\par Total hip -2.7, osteoporosis, no significant change\par femoral neck -3.4, osteoporosis, no significant change\par  proximal radius -2.9, osteoporosis, no significant change\par \par Thyroid ultrasound November 24, 2015\par 2 small left nodules\par Largest 8 mm sagittal smaller 4 mm sagittal\par \par \par bone mineral density test performed November 19, 2014\par spine -2.7,, osteoporosis, no significant change versus 2013\par total hip -2.7, osteoporosis, no significant change versus outside study of 2013\par Femoral neck -3.3, osteoporosis, appears decreased versus 2013 but this appears to be due to changing equipment then changing region of interest\par Proximal radius -2.6, osteoporosis, no prior study

## 2021-03-09 NOTE — END OF VISIT
[FreeTextEntry3] : I, Paulo Alex, authored this note working as a medical scribe for Dr. Gaston.  03/08/2021. 12:15PM.  This note was authored by the medical scribe for me. I have reviewed, edited, and revised the note as needed. I am in agreement with the exam findings, imaging findings, and treatment plan.  Prince Gaston MD

## 2021-03-09 NOTE — HISTORY OF PRESENT ILLNESS
[Alendronate (Fosomax)] : Alendronate [Raloxifene (Evista)] : Raloxifene [Risedronate (Actonel)] : Risedronate [Calcium (dietary)] : calcium from their regular diet [Prolia (Denosumab)] : Prolia (Denosumab) [Family History of Osteoporosis] : family history of osteoporosis [Family History of Breast Cancer] : family history of breast cancer [Regular Dental Follow-Up] : regular dental follow-up [FreeTextEntry1] : No significant health change. No interval surgeries, fractures, health change, or change in medications.\par \par First diagnosed with low bone density at age 51. Patient took Fosamax for 8 to 9 years. stopped about 6 years. Did not tolerate Actonel in past due to UGI sx's. Switched to Evista few years ago, and tolerated well now d/c. Had one dose of Prolia from Dr. Benoit several years ago. BMD Nov 2016 decreased. Transitioned to Prolia 11/2016. Tolerating well. No thigh pain, no interval fx. Last DDS within past 6 months. No ONJ. Had wisdom tooth extraction, left upper, healed well.. BMD 12/2017 improved but still low. BMD 2/2020 indicates improving osteopenia in spine, stable osteoporosis in total hip and proximal radius, and improving osteoporosis in femoral neck. \par \par History multinodular goiter. was on low-dose Synthroid 25 mcg daily for suppression of nodules. Stopped LT4 25, no change in sx's. Patient unaware of hypothyroidism. No change in neck or sx's. No local neck pain. No dysphagia or dysphonia. No raciness, shakiness, heat/cold intolerance, tiredness, or fatigue. No palpitations, tremors, or sudden weight gain/loss.\par \par Prior episodes of diverticulitis, thrush, and vertigo.\par \par Pt c/o hoarse throat. [Amenorrhea] : no past or present history of amenorrhea [Disordered Eating] : no past or present history of disordered eating [Taking Steroids] : no past or present history of taking steroids [Kidney Stones] : no history of kidney stones [Family History of Hip Fracture] : no family history of hip fracture [Hyperparathyroidism] : no hyperparathyroidism [History of Radiation Therapy] : no history of radiation therapy [History of Blood Clots] : no history of blood clots [de-identified] : mother breast cancer

## 2021-06-09 ENCOUNTER — APPOINTMENT (OUTPATIENT)
Dept: SURGERY | Facility: CLINIC | Age: 71
End: 2021-06-09
Payer: MEDICARE

## 2021-06-09 PROCEDURE — 99213K: CUSTOM

## 2021-09-13 ENCOUNTER — APPOINTMENT (OUTPATIENT)
Dept: ENDOCRINOLOGY | Facility: CLINIC | Age: 71
End: 2021-09-13
Payer: MEDICARE

## 2021-09-13 PROCEDURE — 96372 THER/PROPH/DIAG INJ SC/IM: CPT

## 2021-09-13 RX ORDER — DENOSUMAB 60 MG/ML
60 INJECTION SUBCUTANEOUS
Qty: 1 | Refills: 0 | Status: COMPLETED | OUTPATIENT
Start: 2021-09-13

## 2021-09-13 RX ADMIN — DENOSUMAB 0 MG/ML: 60 INJECTION SUBCUTANEOUS at 00:00

## 2021-09-14 ENCOUNTER — TRANSCRIPTION ENCOUNTER (OUTPATIENT)
Age: 71
End: 2021-09-14

## 2022-02-15 ENCOUNTER — APPOINTMENT (OUTPATIENT)
Dept: MRI IMAGING | Facility: CLINIC | Age: 72
End: 2022-02-15
Payer: MEDICARE

## 2022-02-15 ENCOUNTER — OUTPATIENT (OUTPATIENT)
Dept: OUTPATIENT SERVICES | Facility: HOSPITAL | Age: 72
LOS: 1 days | End: 2022-02-15
Payer: MEDICARE

## 2022-02-15 DIAGNOSIS — Z00.00 ENCOUNTER FOR GENERAL ADULT MEDICAL EXAMINATION WITHOUT ABNORMAL FINDINGS: ICD-10-CM

## 2022-02-15 PROCEDURE — 77049 MRI BREAST C-+ W/CAD BI: CPT | Mod: 26,MH

## 2022-02-15 PROCEDURE — C8937: CPT

## 2022-02-15 PROCEDURE — C8908: CPT | Mod: MH

## 2022-02-15 PROCEDURE — A9585: CPT

## 2022-03-21 ENCOUNTER — APPOINTMENT (OUTPATIENT)
Dept: ENDOCRINOLOGY | Facility: CLINIC | Age: 72
End: 2022-03-21
Payer: MEDICARE

## 2022-03-21 ENCOUNTER — LABORATORY RESULT (OUTPATIENT)
Age: 72
End: 2022-03-21

## 2022-03-21 VITALS
SYSTOLIC BLOOD PRESSURE: 112 MMHG | TEMPERATURE: 97 F | BODY MASS INDEX: 21 KG/M2 | OXYGEN SATURATION: 94 % | HEART RATE: 61 BPM | WEIGHT: 120 LBS | DIASTOLIC BLOOD PRESSURE: 62 MMHG | HEIGHT: 63.5 IN

## 2022-03-21 PROCEDURE — 99214 OFFICE O/P EST MOD 30 MIN: CPT | Mod: 25

## 2022-03-21 PROCEDURE — ZZZZZ: CPT

## 2022-03-21 PROCEDURE — 77080 DXA BONE DENSITY AXIAL: CPT

## 2022-03-21 PROCEDURE — 96372 THER/PROPH/DIAG INJ SC/IM: CPT

## 2022-03-21 RX ORDER — DENOSUMAB 60 MG/ML
60 INJECTION SUBCUTANEOUS
Qty: 1 | Refills: 0 | Status: COMPLETED | OUTPATIENT
Start: 2022-03-21

## 2022-03-21 RX ADMIN — DENOSUMAB 0 MG/ML: 60 INJECTION SUBCUTANEOUS at 00:00

## 2022-03-22 NOTE — END OF VISIT
[FreeTextEntry3] : I, Paulo Alex, authored this note working as a medical scribe for Dr. Gaston.  03/21/2022. 12:30PM. This note was authored by the medical scribe for me. I have reviewed, edited, and revised the note as needed. I am in agreement with the exam findings, imaging findings, and treatment plan.  Prince Gaston MD

## 2022-03-22 NOTE — PROCEDURE
[FreeTextEntry1] : Thyroid US - 03/21/2022\par Left: isoechoic nodule 7 mm x 4 mm x 10 mm\par Right: cyst 7 mm x 5 mm x 8 mm\par \par Bone mineral density: 03/21/2022 \par Indication: vs. 2020\par Spine: (L1-L3) -2.0 osteopenia, no significant change\par Total hip: -2.7 osteoporosis, no significant change\par Femoral neck: -3.0 osteoporosis, no significant change\par Proximal radius: -2.9 osteoporosis, no significant change\par \par Thyroid US - 03/08/2021\par Left: upper hypoechoic nodule 7 mm x 5 mm x 8 mm\par Right: cyst 7 mm x 7 mm x 10 mm\par \par Thyroid US - 02/20/2020\par Left: solid nodule 8 x 6 x 9 mm\par Right: cyst 6 x 5 x 8 mm\par \par Bone mineral density: 02/20/2020 \par Indication: vs. 2017\par Spine: (L1-L3) -2.1 osteopenia, +5.0%, approx. +14% since 2016\par Total hip: -2.7 osteoporosis, no significant change, approx +6% vs. 2016\par Femoral neck: -3.0 osteoporosis, +8.0%\par Proximal radius: -2.8 osteoporosis, no significant change\par \par Thyroid US - 07/5/19\par Right: Cystic nodule 7 x 6 x 10 mm\par Left:  nodules 8 x 6 x10\par 2nd nodule not identified\par \par Thyroid US - 06/15/2018 \par Right: Cystic nodule 7 x 6 x 8 mm\par Left: two nodules 5 x 7 x 9\par left  6 x 7 x 8 mm nodule\par \par Thyroid US - 06/15/2018 \par Right: Cystic nodule 7 x 6 x 8 mm\par Left: Possibly two nodules 8 x 5 x 10 mm, adjacent 6 mm nodule\par \par Bone mineral density 12/8/17\par indication: assess response to medication \par spine -2.5 excluding L4 osteoporosis +8.7%\par total hip -2.8, osteoporosis +4.2%\par femoral neck -3.4 osteoporosis, no significant change \par proximal radius  -3.0 osteoporosis, no significant change \par \par Thyroid ultrasound  December 8, 2017\par right 8 mm mostly cystic nodule\par Left 6 x 4 x 11 mm nodule\par \par Bone mineral density November 29, 2016\par Comparison to 2015\par Spine L1-L3 -3.0, osteoporosis, no significant change Versus 2015 but -4.1% versus 2014\par Total hip -3.0, osteoporosis, -5.3% \par Femoral neck -3.2, osteoporosis, +4.2% \par Proximal radius -2.8, osteoporosis, no significant \par \par Thyroid ultrasound November 29, 2016 \par Right 4 mm simple cyst \par Left 6 4  x 10 mm cystic nodule \par Second left partially cystic nodule 6 x 5  x 10 mm Nodule\par \par \par bone mineral density November 24, 2015\par Indication prior test showed bone loss\par Spine -2.9, osteoporosis, no significant change versus 2014 or 2013\par Total hip -2.7, osteoporosis, no significant change\par femoral neck -3.4, osteoporosis, no significant change\par  proximal radius -2.9, osteoporosis, no significant change\par \par Thyroid ultrasound November 24, 2015\par 2 small left nodules\par Largest 8 mm sagittal smaller 4 mm sagittal\par \par \par bone mineral density test performed November 19, 2014\par spine -2.7,, osteoporosis, no significant change versus 2013\par total hip -2.7, osteoporosis, no significant change versus outside study of 2013\par Femoral neck -3.3, osteoporosis, appears decreased versus 2013 but this appears to be due to changing equipment then changing region of interest\par Proximal radius -2.6, osteoporosis, no prior study

## 2022-03-22 NOTE — ASSESSMENT
[Denosumab Therapy] : Risks  and benefits of denosumab therapy were discussed with the patient including eczema, cellulitis, osteonecrosis of the jaw and atypical femur fractures [FreeTextEntry1] : 71 year-old female with\par \par 1. Osteoporosis. BMD 11/2016 showed progressive decrease in spine and hip. Was on Evista after long-term Fosamax. Transitioned to Prolia 11/2016. Tolerating well. No thigh pain, no interval fx. Normal Ca. No ONJ. BMD 12/2017 significantly improved in spine. BMD 2/2020 indicates improving osteopenia in spine, stable osteoporosis in total hip and proximal radius, and improving osteoporosis in femoral neck. BMD 3/2022 indicates stable osteopenia in spine and stable osteoporosis in all other sites. Continue Prolia, buy and bill.\par \par 2. Clinically and chemically euthyroid after stopping low dose LT4 for suppression of small thyroid nodules; lack of data for this reviewed. No local neck pain. No dysphagia or dysphonia. No raciness, shakiness, heat/cold intolerance, tiredness, or fatigue. No palpitations, tremors, or sudden weight gain/loss. TUS c/w stable trivial left nodule and trivial right cyst. Observe.\par \par Request labs sent out. Will include repeat TFT.\par \par F/u in 6 months

## 2022-03-22 NOTE — HISTORY OF PRESENT ILLNESS
[Alendronate (Fosomax)] : Alendronate [Risedronate (Actonel)] : Risedronate [Raloxifene (Evista)] : Raloxifene [Denosumab (Prolia)] : Denosumab [FreeTextEntry1] : No significant interval health changes. No interval surgery, hospitalizations, fractures, or change in medications.\par \par First diagnosed with low bone density at age 51. Patient took Fosamax for 8 to 9 years. stopped about 6 years. Did not tolerate Actonel in past due to UGI sx's. Switched to Evista few years ago, and tolerated well now d/c. Had one dose of Prolia from Dr. Benoit several years ago. BMD 11/2016 showed progressive decrease in spine and hip. Transitioned to Prolia 11/2016. Tolerating well. No thigh pain, no interval fx. Normal Ca. Last DDS within past 6 months. No ONJ. Not planning major dental work. BMD 12/2017 improved but still low. BMD 2/2020 indicates improving osteopenia in spine, stable osteoporosis in total hip and proximal radius, and improving osteoporosis in femoral neck. \par \par History multinodular goiter. was on low-dose Synthroid 25 mcg daily for suppression of nodules. Stopped rx, no change in sx's. Patient unaware of hypothyroidism. No change in neck or sx's. No local neck pain. No dysphagia or dysphonia. No raciness, shakiness, heat/cold intolerance, tiredness, or fatigue. No palpitations, tremors, or sudden weight gain/loss.\par \par Prior episodes of diverticulitis, thrush, and vertigo.

## 2022-03-23 LAB
25(OH)D3 SERPL-MCNC: 46.5 NG/ML
ALBUMIN SERPL ELPH-MCNC: 5.1 G/DL
ALP BLD-CCNC: 41 U/L
ALT SERPL-CCNC: 26 U/L
ANION GAP SERPL CALC-SCNC: 14 MMOL/L
AST SERPL-CCNC: 27 U/L
BILIRUB SERPL-MCNC: 0.4 MG/DL
BUN SERPL-MCNC: 17 MG/DL
CALCIUM SERPL-MCNC: 10.4 MG/DL
CALCIUM SERPL-MCNC: 10.4 MG/DL
CHLORIDE SERPL-SCNC: 104 MMOL/L
CO2 SERPL-SCNC: 24 MMOL/L
CREAT SERPL-MCNC: 0.66 MG/DL
EGFR: 94 ML/MIN/1.73M2
GLUCOSE SERPL-MCNC: 90 MG/DL
PARATHYROID HORMONE INTACT: 102 PG/ML
PHOSPHATE SERPL-MCNC: 3.3 MG/DL
POTASSIUM SERPL-SCNC: 5.3 MMOL/L
PROT SERPL-MCNC: 7.3 G/DL
SODIUM SERPL-SCNC: 142 MMOL/L
T3RU NFR SERPL: 1.2 TBI
T4 SERPL-MCNC: 6.9 UG/DL
TSH SERPL-ACNC: 3.74 UIU/ML

## 2022-03-28 ENCOUNTER — TRANSCRIPTION ENCOUNTER (OUTPATIENT)
Age: 72
End: 2022-03-28

## 2022-05-16 ENCOUNTER — APPOINTMENT (OUTPATIENT)
Dept: SURGERY | Facility: CLINIC | Age: 72
End: 2022-05-16
Payer: MEDICARE

## 2022-05-16 PROCEDURE — 99213K: CUSTOM

## 2022-08-29 ENCOUNTER — TRANSCRIPTION ENCOUNTER (OUTPATIENT)
Age: 72
End: 2022-08-29

## 2022-08-30 ENCOUNTER — TRANSCRIPTION ENCOUNTER (OUTPATIENT)
Age: 72
End: 2022-08-30

## 2022-09-13 ENCOUNTER — TRANSCRIPTION ENCOUNTER (OUTPATIENT)
Age: 72
End: 2022-09-13

## 2022-10-06 ENCOUNTER — APPOINTMENT (OUTPATIENT)
Dept: ENDOCRINOLOGY | Facility: CLINIC | Age: 72
End: 2022-10-06

## 2022-10-06 VITALS
HEART RATE: 68 BPM | OXYGEN SATURATION: 99 % | BODY MASS INDEX: 21 KG/M2 | WEIGHT: 120 LBS | DIASTOLIC BLOOD PRESSURE: 70 MMHG | HEIGHT: 63.5 IN | SYSTOLIC BLOOD PRESSURE: 114 MMHG | TEMPERATURE: 97.3 F

## 2022-10-06 PROCEDURE — 99214 OFFICE O/P EST MOD 30 MIN: CPT | Mod: 25

## 2022-10-06 PROCEDURE — 96372 THER/PROPH/DIAG INJ SC/IM: CPT

## 2022-10-06 RX ORDER — DENOSUMAB 60 MG/ML
60 INJECTION SUBCUTANEOUS
Qty: 1 | Refills: 0 | Status: COMPLETED | OUTPATIENT
Start: 2022-10-06

## 2022-10-06 RX ADMIN — DENOSUMAB 0 MG/ML: 60 INJECTION SUBCUTANEOUS at 00:00

## 2022-10-07 NOTE — END OF VISIT
[FreeTextEntry3] : This note was written by Fanta Wallace on ( October 6, 2022) acting as a medical scribe for Dr. Gaston This note was authored by the medical scribe for me. I have reviewed, edited, and revised the note as needed. I am in agreement with the exam findings, imaging findings, and treatment plan.  Prince Gaston MD

## 2022-10-07 NOTE — PROCEDURE
[FreeTextEntry1] : Thyroid US - 10/6/2022\par Left: upper isoechoic nodule 8 mm x 5 mm x 9 mm, lateral nodule slightly hypoechoic  8mm x 5mm x 9mm\par Right: simple cyst 6 mm x 4 mm x 7 mm\par \par Thyroid US - 03/21/2022\par Left: isoechoic nodule 7 mm x 4 mm x 10 mm\par Right: cyst 7 mm x 5 mm x 8 mm\par \par Bone mineral density: 03/21/2022 \par Indication: vs. 2020\par Spine: (L1-L3) -2.0 osteopenia, no significant change\par Total hip: -2.7 osteoporosis, no significant change\par Femoral neck: -3.0 osteoporosis, no significant change\par Proximal radius: -2.9 osteoporosis, no significant change\par \par Thyroid US - 03/08/2021\par Left: upper hypoechoic nodule 7 mm x 5 mm x 8 mm\par Right: cyst 7 mm x 7 mm x 10 mm\par \par Thyroid US - 02/20/2020\par Left: solid nodule 8 x 6 x 9 mm\par Right: cyst 6 x 5 x 8 mm\par \par Bone mineral density: 02/20/2020 \par Indication: vs. 2017\par Spine: (L1-L3) -2.1 osteopenia, +5.0%, approx. +14% since 2016\par Total hip: -2.7 osteoporosis, no significant change, approx +6% vs. 2016\par Femoral neck: -3.0 osteoporosis, +8.0%\par Proximal radius: -2.8 osteoporosis, no significant change\par \par Thyroid US - 07/5/19\par Right: Cystic nodule 7 x 6 x 10 mm\par Left:  nodules 8 x 6 x10\par 2nd nodule not identified\par \par Thyroid US - 06/15/2018 \par Right: Cystic nodule 7 x 6 x 8 mm\par Left: two nodules 5 x 7 x 9\par left  6 x 7 x 8 mm nodule\par \par Thyroid US - 06/15/2018 \par Right: Cystic nodule 7 x 6 x 8 mm\par Left: Possibly two nodules 8 x 5 x 10 mm, adjacent 6 mm nodule\par \par Bone mineral density 12/8/17\par indication: assess response to medication \par spine -2.5 excluding L4 osteoporosis +8.7%\par total hip -2.8, osteoporosis +4.2%\par femoral neck -3.4 osteoporosis, no significant change \par proximal radius  -3.0 osteoporosis, no significant change \par \par Thyroid ultrasound  December 8, 2017\par right 8 mm mostly cystic nodule\par Left 6 x 4 x 11 mm nodule\par \par Bone mineral density November 29, 2016\par Comparison to 2015\par Spine L1-L3 -3.0, osteoporosis, no significant change Versus 2015 but -4.1% versus 2014\par Total hip -3.0, osteoporosis, -5.3% \par Femoral neck -3.2, osteoporosis, +4.2% \par Proximal radius -2.8, osteoporosis, no significant \par \par Thyroid ultrasound November 29, 2016 \par Right 4 mm simple cyst \par Left 6 4  x 10 mm cystic nodule \par Second left partially cystic nodule 6 x 5  x 10 mm Nodule\par \par \par bone mineral density November 24, 2015\par Indication prior test showed bone loss\par Spine -2.9, osteoporosis, no significant change versus 2014 or 2013\par Total hip -2.7, osteoporosis, no significant change\par femoral neck -3.4, osteoporosis, no significant change\par  proximal radius -2.9, osteoporosis, no significant change\par \par Thyroid ultrasound November 24, 2015\par 2 small left nodules\par Largest 8 mm sagittal smaller 4 mm sagittal\par \par \par bone mineral density test performed November 19, 2014\par spine -2.7,, osteoporosis, no significant change versus 2013\par total hip -2.7, osteoporosis, no significant change versus outside study of 2013\par Femoral neck -3.3, osteoporosis, appears decreased versus 2013 but this appears to be due to changing equipment then changing region of interest\par Proximal radius -2.6, osteoporosis, no prior study

## 2022-10-07 NOTE — ASSESSMENT
[Denosumab Therapy] : Risks  and benefits of denosumab therapy were discussed with the patient including eczema, cellulitis, osteonecrosis of the jaw and atypical femur fractures [FreeTextEntry1] : 72 year-old female with\par \par 1. Osteoporosis. BMD 11/2016 showed progressive decrease in spine and hip. Was on Evista after long-term Fosamax. Transitioned to Prolia 11/2016. Tolerating well. No thigh pain, no interval fx. Normal Ca. No ONJ. BMD 12/2017 significantly improved in spine. BMD 2/2020 indicates improving osteopenia in spine, stable osteoporosis in total hip and proximal radius, and improving osteoporosis in femoral neck. BMD 3/2022 indicates stable osteopenia in spine and stable osteoporosis in all other sites. Continue Prolia, buy and bill.\par \par 2. Clinically and chemically euthyroid after stopping low dose LT4 for suppression of small thyroid nodules; lack of data for this reviewed. No local neck pain. No dysphagia or dysphonia. No raciness, shakiness, heat/cold intolerance, tiredness, or fatigue. No palpitations, tremors, or sudden weight gain/loss. TUS c/w stable trivial left nodules and trivial right cyst. Observe.\par \par Pt does have IBS. \par \par Labs 2022\par Calcium 9.3\par Creatinine 0.6 \par TSH 2.74 \par PTH 75\par Phosphorus 3.5 \par \par F/u in 6 months

## 2022-10-07 NOTE — HISTORY OF PRESENT ILLNESS
[Alendronate (Fosomax)] : Alendronate [Risedronate (Actonel)] : Risedronate [Raloxifene (Evista)] : Raloxifene [Denosumab (Prolia)] : Denosumab [FreeTextEntry1] : Patient returns for a follow up visit for osteoporosis. Since the last visit pt has no significant interval health changes. No interval surgery, hospitalizations, fractures, or change in medications.\par \par First diagnosed with low bone density at age 51. Patient took Fosamax for 8 to 9 years. stopped about 6 years. Did not tolerate Actonel in past due to UGI sx's. Switched to Evista few years ago, and tolerated well now d/c. Had one dose of Prolia from Dr. Benoit several years ago. BMD 11/2016 showed progressive decrease in spine and hip. Transitioned to Prolia 11/2016. Tolerating well. No thigh pain, no interval fx. Normal Ca. Last DDS within past 6 months. No ONJ. Not planning major dental work. BMD 12/2017 improved but still low. BMD 2/2020 indicates improving osteopenia in spine, stable osteoporosis in total hip and proximal radius, and improving osteoporosis in femoral neck. \par \par History multinodular goiter. was on low-dose Synthroid 25 mcg daily for suppression of nodules. Stopped rx, no change in sx's. Patient unaware of hypothyroidism. No change in neck or sx's. No local neck pain. No dysphagia or dysphonia. No raciness, shakiness, heat/cold intolerance, tiredness, or fatigue. No palpitations, tremors, or sudden weight gain/loss.\par \par Pt has IBS. Prior episodes of diverticulitis, thrush, and vertigo.

## 2022-10-07 NOTE — PHYSICAL EXAM
[Alert] : alert [Well Nourished] : well nourished [No Acute Distress] : no acute distress [Well Developed] : well developed [Normal Sclera/Conjunctiva] : normal sclera/conjunctiva [EOMI] : extra ocular movement intact [No Proptosis] : no proptosis [Thyroid Not Enlarged] : the thyroid was not enlarged [No Thyroid Nodules] : no palpable thyroid nodules [Clear to Auscultation] : lungs were clear to auscultation bilaterally [Normal S1, S2] : normal S1 and S2 [Normal Rate] : heart rate was normal [Regular Rhythm] : with a regular rhythm [No Edema] : no peripheral edema [Normal Bowel Sounds] : normal bowel sounds [Not Tender] : non-tender [Not Distended] : not distended [Soft] : abdomen soft [Normal Anterior Cervical Nodes] : no anterior cervical lymphadenopathy [No Spinal Tenderness] : no spinal tenderness [Spine Straight] : spine straight [No Stigmata of Cushings Syndrome] : no stigmata of Cushings Syndrome [Normal Gait] : normal gait [Normal Reflexes] : deep tendon reflexes were 2+ and symmetric [No Tremors] : no tremors [Oriented x3] : oriented to person, place, and time [Normal Oropharynx] : the oropharynx was normal [No Respiratory Distress] : no respiratory distress [No Accessory Muscle Use] : no accessory muscle use [Pedal Pulses Normal] : the pedal pulses are present [Normal Posterior Cervical Nodes] : no posterior cervical lymphadenopathy [Normal Strength/Tone] : muscle strength and tone were normal [No Rash] : no rash [Acanthosis Nigricans] : no acanthosis nigricans

## 2023-02-14 ENCOUNTER — TRANSCRIPTION ENCOUNTER (OUTPATIENT)
Age: 73
End: 2023-02-14

## 2023-02-15 ENCOUNTER — APPOINTMENT (OUTPATIENT)
Dept: ENDOCRINOLOGY | Facility: CLINIC | Age: 73
End: 2023-02-15
Payer: MEDICARE

## 2023-02-15 VITALS
OXYGEN SATURATION: 97 % | HEART RATE: 79 BPM | HEIGHT: 63.5 IN | SYSTOLIC BLOOD PRESSURE: 110 MMHG | BODY MASS INDEX: 21 KG/M2 | DIASTOLIC BLOOD PRESSURE: 80 MMHG | WEIGHT: 120 LBS

## 2023-02-15 DIAGNOSIS — M25.559 PAIN IN UNSPECIFIED HIP: ICD-10-CM

## 2023-02-15 DIAGNOSIS — M81.0 AGE-RELATED OSTEOPOROSIS W/OUT CURRENT PATHOLOGICAL FRACTURE: ICD-10-CM

## 2023-02-15 PROCEDURE — 99214 OFFICE O/P EST MOD 30 MIN: CPT

## 2023-02-15 NOTE — HISTORY OF PRESENT ILLNESS
[Alendronate (Fosomax)] : Alendronate [Risedronate (Actonel)] : Risedronate [Raloxifene (Evista)] : Raloxifene [Denosumab (Prolia)] : Denosumab [FreeTextEntry1] : Patient returns for an acute visit for concern for hip pain in the setting of current osteoporosis treatment. \par \par Being followed for osteoporosis. Since the last visit pt has no significant interval health changes. No interval surgery, hospitalizations, fractures, or change in medications.\par \par First diagnosed with low bone density at age 51. Patient took Fosamax for 8 to 9 years. stopped about 6 years. Did not tolerate Actonel in past due to UGI sx's. Switched to Evista few years ago, and tolerated well now d/c. Had one dose of Prolia from Dr. Benoit several years ago. BMD 11/2016 showed progressive decrease in spine and hip. *Transitioned to Prolia 11/2016.* Tolerating well. No thigh pain, no interval fx. Normal Ca. Last DDS within past 6 months. No ONJ. Not planning major dental work. BMD 12/2017 improved but still low. BMD 2/2020 indicates improving osteopenia in spine, stable osteoporosis in total hip and proximal radius, and improving osteoporosis in femoral neck. \par \par History multinodular goiter. was on low-dose Synthroid 25 mcg daily for suppression of nodules. Stopped rx, no change in sx's. Patient unaware of hypothyroidism. No change in neck or sx's. No local neck pain. No dysphagia or dysphonia. No raciness, shakiness, heat/cold intolerance, tiredness, or fatigue. No palpitations, tremors, or sudden weight gain/loss.\par \par Pt has IBS. Prior episodes of diverticulitis, thrush, and vertigo.\par \par Interval History:\par CC: Reporting thigh pain. \par Description: about 1 month ago\par Location: left sided groin/inner thigh, stays here, sometimes moves to the thigh, lower leg, and gluteal area. \par Has been resting which relieves it, is able to walk and go up and down steps. \par Weakness when going from squatting standing. \par Severity: 5/10\par Description: feels sore like bruising "as if I had a bruise and I was pressing on it."\par Relieved by laying down, worsened by standing on the left, walking around. Going from sitting to standing position makes it worse. 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 \par Interval health changes: none other, no falls, no fractures since last visit. She did get a new couch which is firmer, and she has been sitting differently.

## 2023-02-15 NOTE — PHYSICAL EXAM
[Alert] : alert [Well Nourished] : well nourished [No Acute Distress] : no acute distress [Well Developed] : well developed [No Respiratory Distress] : no respiratory distress [No Accessory Muscle Use] : no accessory muscle use [Clear to Auscultation] : lungs were clear to auscultation bilaterally [Normal S1, S2] : normal S1 and S2 [Normal Rate] : heart rate was normal [Regular Rhythm] : with a regular rhythm [No Edema] : no peripheral edema [Normal Gait] : normal gait [Oriented x3] : oriented to person, place, and time [No Joint Swelling] : no joint swelling seen [Normal Strength/Tone] : muscle strength and tone were normal [de-identified] : No pain on passive flexion/extension, internal/external rotation, some tenderness on left leg on anterior portion of medial thigh. Patient is able to bear weight on leg, gait is normal.

## 2023-02-15 NOTE — ASSESSMENT
[Denosumab Therapy] : Risks  and benefits of denosumab therapy were discussed with the patient including eczema, cellulitis, osteonecrosis of the jaw and atypical femur fractures [FreeTextEntry1] : 72 year-old female with\par \par 1. Hip and thigh pain:\par No pain on passive flexion/extension, internal/external rotation, some tenderness on left leg on anterior portion of medial thigh. Patient is able to bear weight on leg, gait is normal. \par X-ray of left femur.\par Low suspicion for fracture. \par \par 1. Osteoporosis. BMD 11/2016 showed progressive decrease in spine and hip. Was on Evista after long-term Fosamax. Transitioned to Prolia 11/2016. Tolerating well. No thigh pain, no interval fx. Normal Ca. No ONJ. BMD 12/2017 significantly improved in spine. BMD 2/2020 indicates improving osteopenia in spine, stable osteoporosis in total hip and proximal radius, and improving osteoporosis in femoral neck. BMD 3/2022 indicates stable osteopenia in spine and stable osteoporosis in all other sites. \par Last Prolia: 10/6/2022\par \par 2. Clinically and chemically euthyroid after stopping low dose LT4 for suppression of small thyroid nodules; lack of data for this reviewed. No local neck pain. No dysphagia or dysphonia. No raciness, shakiness, heat/cold intolerance, tiredness, or fatigue. No palpitations, tremors, or sudden weight gain/loss. TUS c/w stable trivial left nodules and trivial right cyst. Observe.\par \par Pt does have IBS. \par \par Labs 2022\par Calcium 9.3\par Creatinine 0.6 \par TSH 2.74 \par PTH 75\par Phosphorus 3.5 \par \par F/u as scheduled with Dr. Gaston in April.

## 2023-02-16 ENCOUNTER — OUTPATIENT (OUTPATIENT)
Dept: OUTPATIENT SERVICES | Facility: HOSPITAL | Age: 73
LOS: 1 days | End: 2023-02-16
Payer: MEDICARE

## 2023-02-16 ENCOUNTER — APPOINTMENT (OUTPATIENT)
Dept: MRI IMAGING | Facility: CLINIC | Age: 73
End: 2023-02-16
Payer: MEDICARE

## 2023-02-16 ENCOUNTER — APPOINTMENT (OUTPATIENT)
Dept: RADIOLOGY | Facility: CLINIC | Age: 73
End: 2023-02-16

## 2023-02-16 DIAGNOSIS — Z00.8 ENCOUNTER FOR OTHER GENERAL EXAMINATION: ICD-10-CM

## 2023-02-16 PROCEDURE — 73552 X-RAY EXAM OF FEMUR 2/>: CPT | Mod: 26,LT

## 2023-02-16 PROCEDURE — A9585: CPT

## 2023-02-16 PROCEDURE — 73552 X-RAY EXAM OF FEMUR 2/>: CPT

## 2023-02-16 PROCEDURE — C8937: CPT

## 2023-02-16 PROCEDURE — C8908: CPT | Mod: MH

## 2023-02-16 PROCEDURE — 77049 MRI BREAST C-+ W/CAD BI: CPT | Mod: 26,MH

## 2023-04-11 ENCOUNTER — APPOINTMENT (OUTPATIENT)
Dept: ENDOCRINOLOGY | Facility: CLINIC | Age: 73
End: 2023-04-11
Payer: MEDICARE

## 2023-04-11 VITALS
RESPIRATION RATE: 16 BRPM | HEART RATE: 73 BPM | SYSTOLIC BLOOD PRESSURE: 128 MMHG | OXYGEN SATURATION: 99 % | DIASTOLIC BLOOD PRESSURE: 94 MMHG | BODY MASS INDEX: 21.87 KG/M2 | HEIGHT: 63.5 IN | WEIGHT: 125 LBS

## 2023-04-11 PROCEDURE — 99214 OFFICE O/P EST MOD 30 MIN: CPT | Mod: 25

## 2023-04-11 PROCEDURE — 96372 THER/PROPH/DIAG INJ SC/IM: CPT

## 2023-04-11 RX ORDER — DENOSUMAB 60 MG/ML
60 INJECTION SUBCUTANEOUS
Qty: 1 | Refills: 0 | Status: COMPLETED | OUTPATIENT
Start: 2023-04-11

## 2023-04-11 RX ADMIN — DENOSUMAB 60 MG/ML: 60 INJECTION SUBCUTANEOUS at 00:00

## 2023-04-12 LAB
ALBUMIN SERPL ELPH-MCNC: 4.6 G/DL
ALP BLD-CCNC: 41 U/L
ALT SERPL-CCNC: 21 U/L
ANION GAP SERPL CALC-SCNC: 13 MMOL/L
AST SERPL-CCNC: 23 U/L
BILIRUB SERPL-MCNC: 0.3 MG/DL
BUN SERPL-MCNC: 23 MG/DL
CALCIUM SERPL-MCNC: 10.2 MG/DL
CALCIUM SERPL-MCNC: 10.2 MG/DL
CHLORIDE SERPL-SCNC: 103 MMOL/L
CO2 SERPL-SCNC: 24 MMOL/L
CREAT SERPL-MCNC: 0.63 MG/DL
EGFR: 95 ML/MIN/1.73M2
GLUCOSE SERPL-MCNC: 95 MG/DL
PARATHYROID HORMONE INTACT: 78 PG/ML
PHOSPHATE SERPL-MCNC: 4.2 MG/DL
POTASSIUM SERPL-SCNC: 4.5 MMOL/L
PROT SERPL-MCNC: 6.9 G/DL
SODIUM SERPL-SCNC: 140 MMOL/L
TSH SERPL-ACNC: 3.34 UIU/ML

## 2023-04-12 NOTE — HISTORY OF PRESENT ILLNESS
[Alendronate (Fosomax)] : Alendronate [Risedronate (Actonel)] : Risedronate [Raloxifene (Evista)] : Raloxifene [Denosumab (Prolia)] : Denosumab [FreeTextEntry1] : Patient returns for a follow up visit for osteoporosis. Since the last visit pt has no significant interval health changes. No interval surgery, hospitalizations, fractures, or change in medications.\par \par First diagnosed with low bone density at age 51. Patient took Fosamax for 8 to 9 years. stopped about 6 years. Did not tolerate Actonel in past due to UGI sx's. Switched to Evista few years ago, and tolerated well now d/c. Had one dose of Prolia from Dr. Benoit several years ago. BMD 11/2016 showed progressive decrease in spine and hip. Transitioned to Prolia 11/2016. Tolerating well. No thigh pain, no interval fx. Normal Ca. Last DDS within past 6 months. No ONJ. Not planning major dental work. BMD 12/2017 improved but still low. BMD 2/2020 indicates improving osteopenia in spine, stable osteoporosis in total hip and proximal radius, and improving osteoporosis in femoral neck. \par \par History multinodular goiter. was on low-dose Synthroid 25 mcg daily for suppression of nodules. Stopped rx, no change in sx's. Patient unaware of hypothyroidism. No change in neck or sx's. No local neck pain. No dysphagia or dysphonia. No raciness, shakiness, heat/cold intolerance, tiredness, or fatigue. No palpitations, tremors, or sudden weight gain/loss.\par \par Pt has IBS. Prior episodes of diverticulitis, thrush, and vertigo.\par c/o L sciatica

## 2023-04-12 NOTE — PROCEDURE
[FreeTextEntry1] : thyroid ultrasound April 11, 2023\par Left: upper isoechoic nodule 7 mm x 4 mm x 9 mm,\par  lateral nodule slightly hypoechoic  5 mm x 5 mm x 9 mm\par Right: simple cyst 5 mm x 3 mm x 7 mm\par \par Thyroid US - 10/6/2022\par Left: upper isoechoic nodule 8 mm x 5 mm x 9 mm, lateral nodule slightly hypoechoic  8mm x 5mm x 9mm\par Right: simple cyst 6 mm x 4 mm x 7 mm\par \par Thyroid US - 03/21/2022\par Left: isoechoic nodule 7 mm x 4 mm x 10 mm\par Right: cyst 7 mm x 5 mm x 8 mm\par \par Bone mineral density: 03/21/2022 \par Indication: vs. 2020\par Spine: (L1-L3) -2.0 osteopenia, no significant change\par Total hip: -2.7 osteoporosis, no significant change\par Femoral neck: -3.0 osteoporosis, no significant change\par Proximal radius: -2.9 osteoporosis, no significant change\par \par Thyroid US - 03/08/2021\par Left: upper hypoechoic nodule 7 mm x 5 mm x 8 mm\par Right: cyst 7 mm x 7 mm x 10 mm\par \par Thyroid US - 02/20/2020\par Left: solid nodule 8 x 6 x 9 mm\par Right: cyst 6 x 5 x 8 mm\par \par Bone mineral density: 02/20/2020 \par Indication: vs. 2017\par Spine: (L1-L3) -2.1 osteopenia, +5.0%, approx. +14% since 2016\par Total hip: -2.7 osteoporosis, no significant change, approx +6% vs. 2016\par Femoral neck: -3.0 osteoporosis, +8.0%\par Proximal radius: -2.8 osteoporosis, no significant change\par \par Thyroid US - 07/5/19\par Right: Cystic nodule 7 x 6 x 10 mm\par Left:  nodules 8 x 6 x10\par 2nd nodule not identified\par \par Thyroid US - 06/15/2018 \par Right: Cystic nodule 7 x 6 x 8 mm\par Left: two nodules 5 x 7 x 9\par left  6 x 7 x 8 mm nodule\par \par Thyroid US - 06/15/2018 \par Right: Cystic nodule 7 x 6 x 8 mm\par Left: Possibly two nodules 8 x 5 x 10 mm, adjacent 6 mm nodule\par \par Bone mineral density 12/8/17\par indication: assess response to medication \par spine -2.5 excluding L4 osteoporosis +8.7%\par total hip -2.8, osteoporosis +4.2%\par femoral neck -3.4 osteoporosis, no significant change \par proximal radius  -3.0 osteoporosis, no significant change \par \par Thyroid ultrasound  December 8, 2017\par right 8 mm mostly cystic nodule\par Left 6 x 4 x 11 mm nodule\par \par Bone mineral density November 29, 2016\par Comparison to 2015\par Spine L1-L3 -3.0, osteoporosis, no significant change Versus 2015 but -4.1% versus 2014\par Total hip -3.0, osteoporosis, -5.3% \par Femoral neck -3.2, osteoporosis, +4.2% \par Proximal radius -2.8, osteoporosis, no significant \par \par Thyroid ultrasound November 29, 2016 \par Right 4 mm simple cyst \par Left 6 4  x 10 mm cystic nodule \par Second left partially cystic nodule 6 x 5  x 10 mm Nodule\par \par \par bone mineral density November 24, 2015\par Indication prior test showed bone loss\par Spine -2.9, osteoporosis, no significant change versus 2014 or 2013\par Total hip -2.7, osteoporosis, no significant change\par femoral neck -3.4, osteoporosis, no significant change\par  proximal radius -2.9, osteoporosis, no significant change\par \par Thyroid ultrasound November 24, 2015\par 2 small left nodules\par Largest 8 mm sagittal smaller 4 mm sagittal\par \par \par bone mineral density test performed November 19, 2014\par spine -2.7,, osteoporosis, no significant change versus 2013\par total hip -2.7, osteoporosis, no significant change versus outside study of 2013\par Femoral neck -3.3, osteoporosis, appears decreased versus 2013 but this appears to be due to changing equipment then changing region of interest\par Proximal radius -2.6, osteoporosis, no prior study

## 2023-04-12 NOTE — ASSESSMENT
[Denosumab Therapy] : Risks  and benefits of denosumab therapy were discussed with the patient including eczema, cellulitis, osteonecrosis of the jaw and atypical femur fractures [FreeTextEntry1] : 72 year-old female with\par \par 1. Osteoporosis. BMD 11/2016 showed progressive decrease in spine and hip. Was on Evista after long-term Fosamax. Transitioned to Prolia 11/2016. Tolerating well. No thigh pain, no interval fx. Normal Ca. No ONJ. BMD 12/2017 significantly improved in spine. BMD 2/2020 indicates improving osteopenia in spine, stable osteoporosis in total hip and proximal radius, and improving osteoporosis in femoral neck. BMD 3/2022 indicates stable osteopenia in spine and stable osteoporosis in all other sites. Continue Prolia, buy and bill.\par \par 2. Clinically and chemically euthyroid after stopping low dose LT4 for suppression of small thyroid nodules; lack of data for this reviewed. No local neck pain. No dysphagia or dysphonia. No raciness, shakiness, heat/cold intolerance, tiredness, or fatigue. No palpitations, tremors, or sudden weight gain/loss. TUS c/w stable trivial left nodules and trivial right cyst. Observe.\par \par Pt does have IBS. \par Labs calcium 9.2 PTH 75 normal not laboratory less than 77.\par Suggest follow-up with spinal surgeons for low back pain, such as Dr Davis or Dr Alva\par \par F/u in 6 months

## 2023-04-14 ENCOUNTER — TRANSCRIPTION ENCOUNTER (OUTPATIENT)
Age: 73
End: 2023-04-14

## 2023-04-17 ENCOUNTER — TRANSCRIPTION ENCOUNTER (OUTPATIENT)
Age: 73
End: 2023-04-17

## 2023-05-01 ENCOUNTER — APPOINTMENT (OUTPATIENT)
Dept: SURGERY | Facility: CLINIC | Age: 73
End: 2023-05-01
Payer: MEDICARE

## 2023-05-01 PROCEDURE — 99213K: CUSTOM

## 2023-05-12 ENCOUNTER — APPOINTMENT (OUTPATIENT)
Dept: ORTHOPEDIC SURGERY | Facility: CLINIC | Age: 73
End: 2023-05-12

## 2023-06-16 ENCOUNTER — APPOINTMENT (OUTPATIENT)
Dept: ORTHOPEDIC SURGERY | Facility: CLINIC | Age: 73
End: 2023-06-16
Payer: MEDICARE

## 2023-06-16 VITALS
OXYGEN SATURATION: 100 % | TEMPERATURE: 96.5 F | HEART RATE: 72 BPM | BODY MASS INDEX: 21 KG/M2 | DIASTOLIC BLOOD PRESSURE: 82 MMHG | HEIGHT: 63.5 IN | WEIGHT: 120 LBS | SYSTOLIC BLOOD PRESSURE: 120 MMHG

## 2023-06-16 DIAGNOSIS — M48.07 SPINAL STENOSIS, LUMBOSACRAL REGION: ICD-10-CM

## 2023-06-16 PROCEDURE — 99203 OFFICE O/P NEW LOW 30 MIN: CPT

## 2023-06-16 NOTE — HISTORY OF PRESENT ILLNESS
[de-identified] : Patient is a 73-year-old female with a chief complaint of left-sided hip and groin pain.  Symptoms are worse when she walks.  She has had MRIs of her hip and lumbar spine.  No numbness, tingling, weakness, or bowel or bladder dysfunction. No difficulty with balance or fine motor skills. No fevers or chills. No constitutional symptoms or recent unintended weight loss..

## 2023-06-16 NOTE — PHYSICAL EXAM
[Antalgic] : antalgic [de-identified] : Examination of the lumbar spine reveals no midline tenderness palpation, step-offs, or skin lesions. Decreased range of motion with respect to flexion, extension, lateral bending, and rotation. No tenderness to palpation of the sciatic notch. No tenderness palpation of the bilateral greater trochanters.  She does have some pain with range of motion of her left hip which reproduces some of her symptoms.. No instability of bilateral lower extremities.  Negative NA. Negative straight leg raise bilaterally. No bowstring. Negative femoral stretch. 5 out of 5 iliopsoas, hip abductors, hips adductors, quadriceps, hamstrings, gastrocsoleus, tibialis anterior, extensor hallucis longus, peroneals. Grossly intact sensation to light touch bilateral lower extremities. 1+ patellar and Achilles reflexes. Downgoing Babinski. No clonus. Intact proprioception. Palpable pulses. No skin lesion and no edema on the right and left lower extremities. [de-identified] : Review of her lumbar spine MRI reveals some degenerative pathology with some increase stenosis that appears more right than left.\par \par MRI of her hip report reveals some moderate arthritis and a labral tear.

## 2023-06-16 NOTE — DISCUSSION/SUMMARY
[de-identified] : We discussed further treatment options.  We reviewed her imaging.  Her symptomatology does appear more related to her hip.  I recommend she try course of some physical therapy directed both the lumbar spine and hip girdle area.  Should this fail to alleviate her symptoms I would consider a diagnostic injection of her left hip joint.  She will let me know of any changes or worsening of her symptoms.

## 2023-06-19 ENCOUNTER — APPOINTMENT (OUTPATIENT)
Dept: ORTHOPEDIC SURGERY | Facility: CLINIC | Age: 73
End: 2023-06-19

## 2023-08-19 NOTE — PROCEDURE
The Delivery OB Provider certifies that vaginal examination and/or abdominal examination after the delivery was done and no foreign body was found. [FreeTextEntry1] : \par Thyroid US - 02/20/2020\par Left: solid nodule 8 x 6 x 9 mm\par Right: cyst 6 x 5 x 8 mm\par \par \par Bone mineral density: 02/20/2020 \par Indication: vs. 2017\par Spine: (L1-L3) -2.1 osteopenia, +5.0%, approx. +14% since 2016\par Total hip: -2.7 osteoporosis, no significant change, approx +6% vs. 2016\par Femoral neck: -3.0 osteoporosis, +8.0%\par Proximal radius: -2.8 osteoporosis, no significant change\par \par Thyroid US - 07/5/19\par Right: Cystic nodule 7 x 6 x 10 mm\par Left:  nodules 8 x 6 x10\par 2nd nodule not identified\par \par Thyroid US - 06/15/2018 \par Right: Cystic nodule 7 x 6 x 8 mm\par Left: two nodules 5 x 7 x 9\par left  6 x 7 x 8 mm nodule\par \par Thyroid US - 06/15/2018 \par Right: Cystic nodule 7 x 6 x 8 mm\par Left: Possibly two nodules 8 x 5 x 10 mm, adjacent 6 mm nodule\par \par Bone mineral density 12/8/17\par indication: assess response to medication \par spine -2.5 excluding L4 osteoporosis +8.7%\par total hip -2.8, osteoporosis +4.2%\par femoral neck -3.4 osteoporosis, no significant change \par proximal radius  -3.0 osteoporosis, no significant change \par \par Thyroid ultrasound  December 8, 2017\par right 8 mm mostly cystic nodule\par Left 6 x 4 x 11 mm nodule\par \par Bone mineral density November 29, 2016\par Comparison to 2015\par Spine L1-L3 -3.0, osteoporosis, no significant change Versus 2015 but -4.1% versus 2014\par Total hip -3.0, osteoporosis, -5.3% \par Femoral neck -3.2, osteoporosis, +4.2% \par Proximal radius -2.8, osteoporosis, no significant \par \par Thyroid ultrasound November 29, 2016 \par Right 4 mm simple cyst \par Left 6 4  x 10 mm cystic nodule \par Second left partially cystic nodule 6 x 5  x 10 mm Nodule\par \par \par bone mineral density November 24, 2015\par Indication prior test showed bone loss\par Spine -2.9, osteoporosis, no significant change versus 2014 or 2013\par Total hip -2.7, osteoporosis, no significant change\par femoral neck -3.4, osteoporosis, no significant change\par  proximal radius -2.9, osteoporosis, no significant change\par \par Thyroid ultrasound November 24, 2015\par 2 small left nodules\par Largest 8 mm sagittal smaller 4 mm sagittal\par \par \par bone mineral density test performed November 19, 2014\par spine -2.7,, osteoporosis, no significant change versus 2013\par total hip -2.7, osteoporosis, no significant change versus outside study of 2013\par Femoral neck -3.3, osteoporosis, appears decreased versus 2013 but this appears to be due to changing equipment then changing region of interest\par Proximal radius -2.6, osteoporosis, no prior study

## 2023-09-18 ENCOUNTER — TRANSCRIPTION ENCOUNTER (OUTPATIENT)
Age: 73
End: 2023-09-18

## 2023-10-02 ENCOUNTER — TRANSCRIPTION ENCOUNTER (OUTPATIENT)
Age: 73
End: 2023-10-02

## 2023-10-17 ENCOUNTER — APPOINTMENT (OUTPATIENT)
Dept: ENDOCRINOLOGY | Facility: CLINIC | Age: 73
End: 2023-10-17
Payer: MEDICARE

## 2023-10-17 VITALS
DIASTOLIC BLOOD PRESSURE: 80 MMHG | SYSTOLIC BLOOD PRESSURE: 136 MMHG | OXYGEN SATURATION: 98 % | HEIGHT: 63.5 IN | BODY MASS INDEX: 21.7 KG/M2 | WEIGHT: 124 LBS | HEART RATE: 58 BPM

## 2023-10-17 PROCEDURE — 99214 OFFICE O/P EST MOD 30 MIN: CPT | Mod: 25

## 2023-10-17 PROCEDURE — 96372 THER/PROPH/DIAG INJ SC/IM: CPT

## 2023-10-17 PROCEDURE — 77080 DXA BONE DENSITY AXIAL: CPT | Mod: GA

## 2023-10-17 PROCEDURE — ZZZZZ: CPT

## 2023-10-17 RX ORDER — DENOSUMAB 60 MG/ML
60 INJECTION SUBCUTANEOUS
Qty: 1 | Refills: 0 | Status: COMPLETED | OUTPATIENT
Start: 2023-10-17

## 2023-10-17 RX ADMIN — DENOSUMAB 60 MG/ML: 60 INJECTION SUBCUTANEOUS at 00:00

## 2024-02-20 ENCOUNTER — OUTPATIENT (OUTPATIENT)
Dept: OUTPATIENT SERVICES | Facility: HOSPITAL | Age: 74
LOS: 1 days | End: 2024-02-20
Payer: MEDICARE

## 2024-02-20 ENCOUNTER — APPOINTMENT (OUTPATIENT)
Dept: MRI IMAGING | Facility: CLINIC | Age: 74
End: 2024-02-20
Payer: MEDICARE

## 2024-02-20 DIAGNOSIS — Z00.00 ENCOUNTER FOR GENERAL ADULT MEDICAL EXAMINATION WITHOUT ABNORMAL FINDINGS: ICD-10-CM

## 2024-02-20 PROCEDURE — A9585: CPT

## 2024-02-20 PROCEDURE — C8908: CPT | Mod: MH

## 2024-02-20 PROCEDURE — 77049 MRI BREAST C-+ W/CAD BI: CPT | Mod: 26,MH

## 2024-02-20 PROCEDURE — C8937: CPT

## 2024-04-19 ENCOUNTER — APPOINTMENT (OUTPATIENT)
Dept: ENDOCRINOLOGY | Facility: CLINIC | Age: 74
End: 2024-04-19
Payer: MEDICARE

## 2024-04-19 VITALS
HEART RATE: 71 BPM | WEIGHT: 120 LBS | OXYGEN SATURATION: 98 % | SYSTOLIC BLOOD PRESSURE: 130 MMHG | BODY MASS INDEX: 21 KG/M2 | HEIGHT: 63.5 IN | DIASTOLIC BLOOD PRESSURE: 80 MMHG

## 2024-04-19 DIAGNOSIS — E04.2 NONTOXIC MULTINODULAR GOITER: ICD-10-CM

## 2024-04-19 DIAGNOSIS — M81.0 AGE-RELATED OSTEOPOROSIS W/OUT CURRENT PATHOLOGICAL FRACTURE: ICD-10-CM

## 2024-04-19 DIAGNOSIS — E21.3 HYPERPARATHYROIDISM, UNSPECIFIED: ICD-10-CM

## 2024-04-19 PROCEDURE — 96372 THER/PROPH/DIAG INJ SC/IM: CPT

## 2024-04-19 PROCEDURE — 99214 OFFICE O/P EST MOD 30 MIN: CPT | Mod: 25

## 2024-04-19 RX ORDER — TURMERIC ROOT EXTRACT 500 MG
TABLET ORAL
Refills: 0 | Status: ACTIVE | COMMUNITY

## 2024-04-19 RX ORDER — DENOSUMAB 60 MG/ML
60 INJECTION SUBCUTANEOUS
Qty: 1 | Refills: 0 | Status: COMPLETED | OUTPATIENT
Start: 2024-04-19

## 2024-04-19 RX ADMIN — DENOSUMAB 60 MG/ML: 60 INJECTION SUBCUTANEOUS at 00:00

## 2024-05-13 ENCOUNTER — APPOINTMENT (OUTPATIENT)
Dept: SURGERY | Facility: CLINIC | Age: 74
End: 2024-05-13
Payer: MEDICARE

## 2024-05-13 PROCEDURE — 99213K: CUSTOM

## 2024-10-30 ENCOUNTER — APPOINTMENT (OUTPATIENT)
Dept: ENDOCRINOLOGY | Facility: CLINIC | Age: 74
End: 2024-10-30
Payer: MEDICARE

## 2024-10-30 VITALS
SYSTOLIC BLOOD PRESSURE: 136 MMHG | HEART RATE: 75 BPM | WEIGHT: 121 LBS | DIASTOLIC BLOOD PRESSURE: 78 MMHG | HEIGHT: 63.4 IN | OXYGEN SATURATION: 99 % | BODY MASS INDEX: 21.17 KG/M2

## 2024-10-30 DIAGNOSIS — E21.3 HYPERPARATHYROIDISM, UNSPECIFIED: ICD-10-CM

## 2024-10-30 DIAGNOSIS — E04.2 NONTOXIC MULTINODULAR GOITER: ICD-10-CM

## 2024-10-30 DIAGNOSIS — M81.0 AGE-RELATED OSTEOPOROSIS W/OUT CURRENT PATHOLOGICAL FRACTURE: ICD-10-CM

## 2024-10-30 PROCEDURE — 77080 DXA BONE DENSITY AXIAL: CPT | Mod: GA

## 2024-10-30 PROCEDURE — ZZZZZ: CPT

## 2024-10-30 PROCEDURE — 99214 OFFICE O/P EST MOD 30 MIN: CPT | Mod: 25

## 2024-10-30 PROCEDURE — 96372 THER/PROPH/DIAG INJ SC/IM: CPT

## 2024-10-30 RX ORDER — MIRTAZAPINE 30 MG/1
TABLET, FILM COATED ORAL
Refills: 0 | Status: ACTIVE | COMMUNITY

## 2024-10-30 RX ORDER — PANTOPRAZOLE 40 MG/1
TABLET, DELAYED RELEASE ORAL
Refills: 0 | Status: ACTIVE | COMMUNITY

## 2024-10-30 RX ORDER — DENOSUMAB 60 MG/ML
60 INJECTION SUBCUTANEOUS
Qty: 1 | Refills: 0 | Status: COMPLETED | OUTPATIENT
Start: 2024-10-30

## 2024-10-30 RX ADMIN — DENOSUMAB 60 MG/ML: 60 INJECTION SUBCUTANEOUS at 00:00

## 2025-02-11 ENCOUNTER — OUTPATIENT (OUTPATIENT)
Dept: OUTPATIENT SERVICES | Facility: HOSPITAL | Age: 75
LOS: 1 days | End: 2025-02-11
Payer: MEDICARE

## 2025-02-11 ENCOUNTER — APPOINTMENT (OUTPATIENT)
Dept: MRI IMAGING | Facility: CLINIC | Age: 75
End: 2025-02-11
Payer: MEDICARE

## 2025-02-11 DIAGNOSIS — Z00.00 ENCOUNTER FOR GENERAL ADULT MEDICAL EXAMINATION WITHOUT ABNORMAL FINDINGS: ICD-10-CM

## 2025-02-11 PROCEDURE — A9585: CPT

## 2025-02-11 PROCEDURE — 77049 MRI BREAST C-+ W/CAD BI: CPT | Mod: 26

## 2025-02-11 PROCEDURE — C8937: CPT

## 2025-02-11 PROCEDURE — C8908: CPT | Mod: MH

## 2025-05-01 ENCOUNTER — APPOINTMENT (OUTPATIENT)
Dept: ENDOCRINOLOGY | Facility: CLINIC | Age: 75
End: 2025-05-01
Payer: MEDICARE

## 2025-05-01 PROCEDURE — 96372 THER/PROPH/DIAG INJ SC/IM: CPT

## 2025-05-01 RX ORDER — DENOSUMAB 60 MG/ML
60 INJECTION SUBCUTANEOUS
Qty: 1 | Refills: 0 | Status: COMPLETED | OUTPATIENT
Start: 2025-04-28

## 2025-05-19 ENCOUNTER — APPOINTMENT (OUTPATIENT)
Dept: SURGERY | Facility: CLINIC | Age: 75
End: 2025-05-19
Payer: MEDICARE

## 2025-05-19 PROCEDURE — 99213K: CUSTOM

## 2025-06-04 ENCOUNTER — APPOINTMENT (OUTPATIENT)
Dept: ORTHOPEDIC SURGERY | Facility: CLINIC | Age: 75
End: 2025-06-04
Payer: MEDICARE

## 2025-06-04 DIAGNOSIS — M81.0 AGE-RELATED OSTEOPOROSIS W/OUT CURRENT PATHOLOGICAL FRACTURE: ICD-10-CM

## 2025-06-04 PROCEDURE — 72100 X-RAY EXAM L-S SPINE 2/3 VWS: CPT

## 2025-06-04 PROCEDURE — 99203 OFFICE O/P NEW LOW 30 MIN: CPT

## 2025-06-04 PROCEDURE — 72170 X-RAY EXAM OF PELVIS: CPT

## 2025-06-05 ENCOUNTER — APPOINTMENT (OUTPATIENT)
Dept: MRI IMAGING | Facility: CLINIC | Age: 75
End: 2025-06-05
Payer: MEDICARE

## 2025-06-05 PROCEDURE — 72148 MRI LUMBAR SPINE W/O DYE: CPT

## 2025-06-06 ENCOUNTER — APPOINTMENT (OUTPATIENT)
Dept: ORTHOPEDIC SURGERY | Facility: CLINIC | Age: 75
End: 2025-06-06
Payer: MEDICARE

## 2025-06-06 PROCEDURE — 99214 OFFICE O/P EST MOD 30 MIN: CPT

## 2025-06-09 ENCOUNTER — TRANSCRIPTION ENCOUNTER (OUTPATIENT)
Age: 75
End: 2025-06-09

## 2025-07-03 NOTE — REASON FOR VISIT
None Done [Follow - Up] : a follow-up visit [Thyroid nodule/ MNG] : thyroid nodule/ MNG [Osteoporosis] : osteoporosis